# Patient Record
Sex: FEMALE | Race: WHITE | Employment: FULL TIME | URBAN - METROPOLITAN AREA
[De-identification: names, ages, dates, MRNs, and addresses within clinical notes are randomized per-mention and may not be internally consistent; named-entity substitution may affect disease eponyms.]

---

## 2016-10-03 LAB
ALT SERPL-CCNC: 25 U/L (ref 12–78)
AST SERPL-CCNC: 19 U/L (ref 15–37)
CHOLEST SERPL-MCNC: 195 MG/DL (ref 125–200)
CREAT SERPL-MCNC: 0.74 MG/DL (ref 0.55–1.3)
GLUCOSE SERPL-MCNC: 88 MG/DL (ref 65–100)
HDLC SERPL-MCNC: 63 MG/DL (ref 40–85)
LDLC SERPL CALC-MCNC: 112 MG/DL (ref 80–130)
POTASSIUM SERPL-SCNC: 4.2 MEQ/L (ref 3.5–5.4)
TRIGL SERPL-MCNC: 99 MG/DL (ref 45–150)

## 2017-06-07 LAB
ALT SERPL-CCNC: 28 U/L (ref 12–78)
AST SERPL-CCNC: 23 U/L (ref 15–37)
CREAT SERPL-MCNC: 0.8 MG/DL (ref 0.55–1.3)
GLUCOSE SERPL-MCNC: 96 MG/DL (ref 65–100)
POTASSIUM SERPL-SCNC: 4.5 MEQ/L (ref 3.5–5.4)

## 2017-06-13 ENCOUNTER — TRANSFERRED RECORDS (OUTPATIENT)
Dept: HEALTH INFORMATION MANAGEMENT | Facility: CLINIC | Age: 72
End: 2017-06-13

## 2017-06-24 ENCOUNTER — HEALTH MAINTENANCE LETTER (OUTPATIENT)
Age: 72
End: 2017-06-24

## 2017-07-12 ENCOUNTER — TRANSFERRED RECORDS (OUTPATIENT)
Dept: HEALTH INFORMATION MANAGEMENT | Facility: CLINIC | Age: 72
End: 2017-07-12

## 2017-09-09 ENCOUNTER — HEALTH MAINTENANCE LETTER (OUTPATIENT)
Age: 72
End: 2017-09-09

## 2017-10-04 ENCOUNTER — TRANSFERRED RECORDS (OUTPATIENT)
Dept: HEALTH INFORMATION MANAGEMENT | Facility: CLINIC | Age: 72
End: 2017-10-04

## 2017-10-11 LAB
CREAT SERPL-MCNC: 0.86 MG/DL (ref 0.55–1.3)
GFR SERPL CREATININE-BSD FRML MDRD: 70 ML/MIN/M2
GLUCOSE SERPL-MCNC: 105 MG/DL (ref 65–100)
POTASSIUM SERPL-SCNC: 5.1 MEQ/L (ref 3.5–5.4)

## 2017-10-18 ENCOUNTER — TRANSFERRED RECORDS (OUTPATIENT)
Dept: HEALTH INFORMATION MANAGEMENT | Facility: CLINIC | Age: 72
End: 2017-10-18

## 2018-06-30 ENCOUNTER — HEALTH MAINTENANCE LETTER (OUTPATIENT)
Age: 73
End: 2018-06-30

## 2019-02-19 ENCOUNTER — TRANSFERRED RECORDS (OUTPATIENT)
Dept: MULTI SPECIALTY CLINIC | Facility: CLINIC | Age: 74
End: 2019-02-19

## 2019-02-19 LAB
CREAT SERPL-MCNC: 0.75 MG/DL (ref 0.6–1)
GFR SERPL CREATININE-BSD FRML MDRD: >60 ML/MIN
GLUCOSE SERPL-MCNC: 103 MG/DL (ref 70–110)
POTASSIUM SERPL-SCNC: 4.1 MMOL/L (ref 3.5–5.1)
TSH SERPL-ACNC: 0.73 MIU/ML (ref 0.36–3.74)

## 2019-03-04 ENCOUNTER — TRANSFERRED RECORDS (OUTPATIENT)
Dept: HEALTH INFORMATION MANAGEMENT | Facility: CLINIC | Age: 74
End: 2019-03-04

## 2019-08-14 NOTE — PROGRESS NOTES
"SUBJECTIVE:   Cesia Cardona is a 73 year old female who presents for Preventive Visit.      Are you in the first 12 months of your Medicare coverage?  No    Healthy Habits:     In general, how would you rate your overall health?  Good    Frequency of exercise:  None    Do you usually eat at least 4 servings of fruit and vegetables a day, include whole grains    & fiber and avoid regularly eating high fat or \"junk\" foods?  No    Taking medications regularly:  Yes    Medication side effects:  None    Ability to successfully perform activities of daily living:  No assistance needed    Home Safety:  No safety concerns identified    Hearing Impairment:  No hearing concerns    In the past 6 months, have you been bothered by leaking of urine? Yes    In general, how would you rate your overall mental or emotional health?  Good      PHQ-2 Total Score: 2    Additional concerns today:  Yes    Do you feel safe in your environment? Yes    Do you have a Health Care Directive? Yes: Patient states has Advance Directive and will bring in a copy to clinic.       Fall risk  Fallen 2 or more times in the past year?: No  Any fall with injury in the past year?: No    Cognitive Screening   1) Repeat 3 items (Leader, Season, Table)    2) Clock draw: NORMAL  3) 3 item recall: Recalls 3 objects  Results: 3 items recalled: COGNITIVE IMPAIRMENT LESS LIKELY  Answers for HPI/ROS submitted by the patient on 8/21/2019   If you checked off any problems, how difficult have these problems made it for you to do your work, take care of things at home, or get along with other people?: Not difficult at all  PHQ9 TOTAL SCORE: 7  FARHAT 7 TOTAL SCORE: 4    Mini-CogTM Copyright KAMRAN Ortega. Licensed by the author for use in [facility]; reprinted with permission (esther@.[email or web address]). All rights reserved.          Reviewed and updated as needed this visit by clinical staff  Tobacco  Allergies  Meds  Problems  Med Hx  Surg Hx  Fam Hx  Soc Hx      "     Reviewed and updated as needed this visit by Provider  Tobacco  Allergies  Meds  Problems  Med Hx  Surg Hx  Fam Hx        Social History     Tobacco Use     Smoking status: Never Smoker     Smokeless tobacco: Never Used   Substance Use Topics     Alcohol use: Yes     Comment: socially     If you drink alcohol do you typically have >3 drinks per day or >7 drinks per week? Yes      Alcohol Use 8/21/2019   Prescreen: >3 drinks/day or >7 drinks/week? Yes   AUDIT SCORE  13     AUDIT - Alcohol Use Disorders Identification Test - Reproduced from the World Health Organization Audit 2001 (Second Edition) 8/21/2019   1.  How often do you have a drink containing alcohol? 2 to 3 times a week   2.  How many drinks containing alcohol do you have on a typical day when you are drinking? 3 or 4   3.  How often do you have five or more drinks on one occasion? Less than monthly   4.  How often during the last year have you found that you were not able to stop drinking once you had started? Daily or almost daily   5.  How often during the last year have you failed to do what was normally expected of you because of drinking? Less than monthly   6.  How often during the last year have you needed a first drink in the morning to get yourself going after a heavy drinking session? Never   7.  How often during the last year have you had a feeling of guilt or remorse after drinking? Less than monthly   8.  How often during the last year have you been unable to remember what happened the night before because of your drinking? Monthly   9.  Have you or someone else been injured because of your drinking? No   10. Has a relative, friend, doctor or other health care worker been concerned about your drinking or suggested you cut down? No   TOTAL SCORE 13           -------------------------------------    Current providers sharing in care for this patient include:   Patient Care Team:  Nery Mosley MD as PCP - General (Family  Practice)    The following health maintenance items are reviewed in Epic and correct as of today:  Health Maintenance   Topic Date Due     HEPATITIS C SCREENING  1945     ADVANCE CARE PLANNING  1945     DEPRESSION ACTION PLAN  1945     PHQ-9  1945     DTAP/TDAP/TD IMMUNIZATION (1 - Tdap) 10/19/1972     ZOSTER IMMUNIZATION (1 of 2) 10/19/1997     MAMMO SCREENING  04/01/2002     COLONOSCOPY  10/01/2007     PAP  04/12/2008     DEXA  05/05/2008     LIPID  04/12/2010     MEDICARE ANNUAL WELLNESS VISIT  10/19/2012     FALL RISK ASSESSMENT  10/19/2012     PNEUMOCOCCAL IMMUNIZATION 65+ LOW/MEDIUM RISK (2 of 2 - PCV13) 06/06/2019     INFLUENZA VACCINE (1) 09/01/2019     IPV IMMUNIZATION  Aged Out     MENINGITIS IMMUNIZATION  Aged Out     Labs reviewed in EPIC  Pneumonia Vaccine:unclear -- getting information from clinic as we have info on 23, but need 13 still, she is not convinced of this and thought she had both.  Mammogram Screening: Mammogram Screening: Patient over age 50, mutual decision to screen reflected in health maintenance.    Review of Systems   Constitutional: Negative for chills and fever.   HENT: Negative for congestion, ear pain, hearing loss and sore throat.    Eyes: Negative for pain and visual disturbance.   Respiratory: Positive for cough and shortness of breath.    Cardiovascular: Positive for peripheral edema. Negative for chest pain and palpitations.   Gastrointestinal: Negative for abdominal pain, constipation, diarrhea, heartburn, hematochezia and nausea.   Breasts:  Negative for tenderness, breast mass and discharge.   Genitourinary: Negative for dysuria, frequency, hematuria, pelvic pain, urgency, vaginal bleeding and vaginal discharge.   Musculoskeletal: Positive for arthralgias and joint swelling. Negative for myalgias.   Skin: Negative for rash.   Neurological: Negative for dizziness, weakness, headaches and paresthesias.   Psychiatric/Behavioral: Positive for mood  "changes. The patient is nervous/anxious.      Constitutional, HEENT, cardiovascular, pulmonary, GI, , musculoskeletal, neuro, skin, endocrine and psych systems are negative, except as in HPI or otherwise noted       OBJECTIVE:   /70   Pulse 72   Temp 97.7  F (36.5  C) (Temporal)   Resp 16   Ht 1.702 m (5' 7\")   Wt 101.2 kg (223 lb)   SpO2 96%   BMI 34.93 kg/m   Estimated body mass index is 34.93 kg/m  as calculated from the following:    Height as of this encounter: 1.702 m (5' 7\").    Weight as of this encounter: 101.2 kg (223 lb).  Physical Exam  GENERAL: healthy, alert and no distress  CV: regular rate and rhythm, normal S1 S2, no S3 or S4, no murmur, click or rub, no peripheral edema and peripheral pulses strong  MS: no gross musculoskeletal defects noted, no edema  SKIN: no suspicious lesions or rashes  NEURO: Normal strength and tone, mentation intact and speech normal  PSYCH: mentation appears normal, affect normal/bright        ASSESSMENT / PLAN:       ICD-10-CM    1. Encounter for routine adult health examination without abnormal findings Z00.00 MA SCREENING DIGITAL BILAT - Future  (s+30)     Fecal colorectal cancer screen (FIT)   2. 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 Screen for colon cancer Z12.11    4. Encounter for screening mammogram for breast cancer Z12.31    5. Menopause Z78.0    6. Encounter for Medicare annual wellness exam Z00.00    7. Age-related osteoporosis without current pathological fracture M81.0 alendronate (FOSAMAX) 70 MG tablet   8. Class 1 obesity due to excess calories without serious comorbidity with body mass index (BMI) of 34.0 to 34.9 in adult E66.09 Lipid panel reflex to direct LDL Fasting    Z68.34 Glucose     Discussed her osteoporosis - had been on fosamax, but unsure how long. Stopped as she only had osteopenia she believes. But had bone density in Feb showing osteoporosis and she did not hear from them a plan. " "Looks like multiple labs were done around that time likely for determining secondary reasons for osteoporosis and were normal. But I cannot find any notes discussing this. So will try to get the written records [facility] sent.  Also missing on records -- PCV 13, Tdap, Shingles vacccinations. Found a td, so suggested Tdap likely still needed.  Colon cancer screening -- plans FIT testing and cannot remember when last done. I have records of most of the labs from the last year, so suspect still needed now. Asked to pick one up in lab, but she did not today. Will need to do that and turn it in. She is planning to come back fasting for labs.  Lab - due for lipid and glucose - again would not complete until she checked on coverage. Planning call to her insurance to make sure not completed in the last year.  Mood: has been off 2 months from her celexa of 15+ years and actually is happier than ever. Though she is a little short with others. Discussed mood and admits she tends to over imbibe on alcohol at times and feels she has a bit of an addictive type personality whether over eating or over drinking. Discussed options to address this as well and that her old wellbutrin could have some benefit for cravings and she would like to try this as she is starting from a clean slate at this time. Certainly can return to [facility] as never had issues with this, but would like to try a change.    End of Life Planning:  Patient currently has an advanced directive: Yes.  Practitioner is supportive of decision.    COUNSELING:  Reviewed preventive health counseling, as reflected in patient instructions       Regular exercise       Healthy diet/nutrition    Estimated body mass index is 34.93 kg/m  as calculated from the following:    Height as of this encounter: 1.702 m (5' 7\").    Weight as of this encounter: 101.2 kg (223 lb).    Weight management plan: Discussed healthy diet and exercise guidelines     reports that she has never smoked. She " has never used smokeless tobacco.      Appropriate preventive services were discussed with this patient, including applicable screening as appropriate for cardiovascular disease, diabetes, osteopenia/osteoporosis, and glaucoma.  As appropriate for age/gender, discussed screening for colorectal cancer, prostate cancer, breast cancer, and cervical cancer. Checklist reviewing preventive services available has been given to the patient.    Reviewed patients plan of care and provided an AVS. The Basic Care Plan (routine screening as documented in Health Maintenance) for Cesia meets the Care Plan requirement. This Care Plan has been established and reviewed with the Patient.    Counseling Resources:  ATP IV Guidelines  Pooled Cohorts Equation Calculator  Breast Cancer Risk Calculator  FRAX Risk Assessment  ICSI Preventive Guidelines  Dietary Guidelines for Americans, 2010  USDA's MyPlate  ASA Prophylaxis  Lung CA Screening    Nery Mosley MD, MD  [facility]    Identified Health Risks:

## 2019-08-21 ENCOUNTER — OFFICE VISIT (OUTPATIENT)
Dept: FAMILY MEDICINE | Facility: OTHER | Age: 74
End: 2019-08-21
Payer: MEDICARE

## 2019-08-21 VITALS
TEMPERATURE: 97.7 F | RESPIRATION RATE: 16 BRPM | BODY MASS INDEX: 35 KG/M2 | DIASTOLIC BLOOD PRESSURE: 70 MMHG | WEIGHT: 223 LBS | HEART RATE: 72 BPM | SYSTOLIC BLOOD PRESSURE: 128 MMHG | HEIGHT: 67 IN | OXYGEN SATURATION: 96 %

## 2019-08-21 DIAGNOSIS — E66.811 CLASS 1 OBESITY DUE TO EXCESS CALORIES WITHOUT SERIOUS COMORBIDITY WITH BODY MASS INDEX (BMI) OF 34.0 TO 34.9 IN ADULT: ICD-10-CM

## 2019-08-21 DIAGNOSIS — Z00.00 ENCOUNTER FOR ROUTINE ADULT HEALTH EXAMINATION WITHOUT ABNORMAL FINDINGS: Primary | ICD-10-CM

## 2019-08-21 DIAGNOSIS — Z78.0 MENOPAUSE: ICD-10-CM

## 2019-08-21 DIAGNOSIS — Z00.00 ENCOUNTER FOR MEDICARE ANNUAL WELLNESS EXAM: ICD-10-CM

## 2019-08-21 DIAGNOSIS — E66.09 CLASS 1 OBESITY DUE TO EXCESS CALORIES WITHOUT SERIOUS COMORBIDITY WITH BODY MASS INDEX (BMI) OF 34.0 TO 34.9 IN ADULT: ICD-10-CM

## 2019-08-21 DIAGNOSIS — Z12.11 SCREEN FOR COLON CANCER: ICD-10-CM

## 2019-08-21 DIAGNOSIS — M81.0 AGE-RELATED OSTEOPOROSIS WITHOUT CURRENT PATHOLOGICAL FRACTURE: ICD-10-CM

## 2019-08-21 DIAGNOSIS — Z12.31 ENCOUNTER FOR SCREENING MAMMOGRAM FOR BREAST CANCER: ICD-10-CM

## 2019-08-21 DIAGNOSIS — F32.1 MODERATE MAJOR DEPRESSION (H): ICD-10-CM

## 2019-08-21 PROBLEM — E55.9 VITAMIN D DEFICIENCY: Status: ACTIVE | Noted: 2017-06-13

## 2019-08-21 PROCEDURE — 99213 OFFICE O/P EST LOW 20 MIN: CPT | Mod: 25 | Performed by: FAMILY MEDICINE

## 2019-08-21 PROCEDURE — G0439 PPPS, SUBSEQ VISIT: HCPCS | Performed by: FAMILY MEDICINE

## 2019-08-21 RX ORDER — ASPIRIN 81 MG/1
TABLET ORAL
COMMUNITY
Start: 2012-05-29 | End: 2022-07-27

## 2019-08-21 RX ORDER — CHLORAL HYDRATE 500 MG
CAPSULE ORAL
COMMUNITY
Start: 2016-10-03 | End: 2022-07-27

## 2019-08-21 RX ORDER — ALENDRONATE SODIUM 70 MG/1
70 TABLET ORAL
Qty: 12 TABLET | Refills: 3 | Status: SHIPPED | OUTPATIENT
Start: 2019-08-21 | End: 2020-07-01

## 2019-08-21 RX ORDER — BUPROPION HYDROCHLORIDE 150 MG/1
150 TABLET ORAL EVERY MORNING
Qty: 30 TABLET | Refills: 1 | Status: SHIPPED | OUTPATIENT
Start: 2019-08-21 | End: 2019-10-28

## 2019-08-21 ASSESSMENT — PATIENT HEALTH QUESTIONNAIRE - PHQ9
10. IF YOU CHECKED OFF ANY PROBLEMS, HOW DIFFICULT HAVE THESE PROBLEMS MADE IT FOR YOU TO DO YOUR WORK, TAKE CARE OF THINGS AT HOME, OR GET ALONG WITH OTHER PEOPLE: NOT DIFFICULT AT ALL
SUM OF ALL RESPONSES TO PHQ QUESTIONS 1-9: 7
SUM OF ALL RESPONSES TO PHQ QUESTIONS 1-9: 7

## 2019-08-21 ASSESSMENT — ANXIETY QUESTIONNAIRES
5. BEING SO RESTLESS THAT IT IS HARD TO SIT STILL: SEVERAL DAYS
GAD7 TOTAL SCORE: 4
GAD7 TOTAL SCORE: 4
7. FEELING AFRAID AS IF SOMETHING AWFUL MIGHT HAPPEN: NOT AT ALL
6. BECOMING EASILY ANNOYED OR IRRITABLE: SEVERAL DAYS
4. TROUBLE RELAXING: NOT AT ALL
3. WORRYING TOO MUCH ABOUT DIFFERENT THINGS: SEVERAL DAYS
GAD7 TOTAL SCORE: 4
1. FEELING NERVOUS, ANXIOUS, OR ON EDGE: SEVERAL DAYS
7. FEELING AFRAID AS IF SOMETHING AWFUL MIGHT HAPPEN: NOT AT ALL
2. NOT BEING ABLE TO STOP OR CONTROL WORRYING: NOT AT ALL

## 2019-08-21 ASSESSMENT — ENCOUNTER SYMPTOMS
COUGH: 1
HEMATURIA: 0
CHILLS: 0
SORE THROAT: 0
DIZZINESS: 0
FREQUENCY: 0
HEMATOCHEZIA: 0
PARESTHESIAS: 0
SHORTNESS OF BREATH: 1
JOINT SWELLING: 1
DIARRHEA: 0
ARTHRALGIAS: 1
NERVOUS/ANXIOUS: 1
HEADACHES: 0
MYALGIAS: 0
FEVER: 0
BREAST MASS: 0
CONSTIPATION: 0
HEARTBURN: 0
NAUSEA: 0
WEAKNESS: 0
DYSURIA: 0
PALPITATIONS: 0
EYE PAIN: 0
ABDOMINAL PAIN: 0

## 2019-08-21 ASSESSMENT — MIFFLIN-ST. JEOR: SCORE: 1559.15

## 2019-08-21 ASSESSMENT — PAIN SCALES - GENERAL: PAINLEVEL: NO PAIN (0)

## 2019-08-21 ASSESSMENT — ACTIVITIES OF DAILY LIVING (ADL): CURRENT_FUNCTION: NO ASSISTANCE NEEDED

## 2019-08-21 NOTE — PROGRESS NOTES
The patient reports that she drinks more than one alcoholic drink per day and sometimes engages in binge or excessive drinking. She was counseled and given information about possible harmful effects of excessive alcohol intake as well as where to get help for alcohol problems.

## 2019-08-21 NOTE — PATIENT INSTRUCTIONS
Please bring in copy of the advanced directive.        Patient Education   Personalized Prevention Plan  You are due for the preventive services outlined below.  Your care team is available to assist you in scheduling these services.  If you have already completed any of these items, please share that information with your care team to update in your medical record.  Health Maintenance Due   Topic Date Due     Hepatitis C Screening  1945     Discuss Advance Care Planning  1945     Depression Action Plan  1945     Depression Assessment  1945     Diptheria Tetanus Pertussis (DTAP/TDAP/TD) Vaccine (1 - Tdap) 10/19/1972     Zoster (Shingles) Vaccine (1 of 2) 10/19/1997     Mammogram  04/01/2002     Osteoporosis Screening  05/05/2007     Colonscopy  10/01/2007     PAP Smear  04/12/2008     Cholesterol Lab  04/12/2010     Annual Wellness Visit  10/19/2012     FALL RISK ASSESSMENT  10/19/2012     Pneumococcal Vaccine (2 of 2 - PCV13) 06/06/2019      Patient Education   Alcohol Use     Many people can enjoy a glass of wine or beer without any negative consequences to their health. According to the Centers for Disease Control and Prevention (CDC), having one or fewer drinks per day for women and two or fewer per day for men is considered moderate drinking.     When people drink more than moderately, it can become concerning. Excessive drinking is defined as consuming 15 drinks or more per week for men and 8 drinks or more per week for women. There are various health problems associated with excessive drinking, which include:    Damage to vital organs like the heart, brain, liver and pancreas    Harm to the digestive tract    Weaken the immune system    Higher risk for heart disease and cancer    There are many resources available to people who are addicted to alcohol. A counselor or other health care provider can give you support. So can a , , or rabbi who is trained in substance abuse  counseling. Friends and family may also help once you are connected with professionals.

## 2019-08-22 ASSESSMENT — PATIENT HEALTH QUESTIONNAIRE - PHQ9: SUM OF ALL RESPONSES TO PHQ QUESTIONS 1-9: 7

## 2019-08-22 ASSESSMENT — ANXIETY QUESTIONNAIRES: GAD7 TOTAL SCORE: 4

## 2019-10-02 ENCOUNTER — TELEPHONE (OUTPATIENT)
Dept: FAMILY MEDICINE | Facility: OTHER | Age: 74
End: 2019-10-02

## 2019-10-02 NOTE — LETTER
[address]   [employer] [address]  Phone: [phone]  October 3, 2019      Cesia Cardona  [address] [address] [address]      Dear Cesia,    We care about your health and have reviewed your health plan including your medical conditions, medications, and lab results.  Based on this review, it is recommended that you follow up regarding the following health topic(s):  -Cholesterol  -Depression  -Breast Cancer Screening    We recommend you take the following action(s):  -schedule a LAB ONLY APPOINTMENT to recheck your: Lipids (fasting cholesterol - nothing to eat except water and/or meds for 8-10 hours) and FIT and Glucose within the next 1-4 weeks.  If' you have had labs completed eslewhere, please let us know so we can update your records.    -schedule a MAMMOGRAM which is due. Please disregard this reminder if you have had this exam elsewhere within the last 1-2 years please let us know so we can update your records.  -Complete and return the attached PHQ-9 Form.  If your total score is greater than 9, please schedule a followup appointment.  If you answer Yes to question 9, call your clinic between the hours of 8 to 5.  You may also call the Suicide Hotline at 4-203-726-IKMH (0227) any time.     Please call us at the [facility] - [phone] (or use vozero) to address the above recommendations.     Thank you for trusting [facility] and we appreciate the opportunity to serve you.  We look forward to supporting your healthcare needs in the future.    Healthy Regards,    Your Health Care Team  [facility]

## 2019-10-02 NOTE — TELEPHONE ENCOUNTER
Panel Management Review    Summary:    Patient is due/failing the following:   LDL, Glucose, FIT, MAMMOGRAM and PHQ9    Action needed:   Patient needs to do PHQ9.  Patient needs fasting lab only appointment   Patient needs referral/order: Mammogram    Type of outreach:    Phone, left message for patient to call back.     Questions for provider review:    None                                                                                                                                    Lisha Simmons CMA ([facility])       Chart routed to Care Team .      Patient has the following on her problem list:     Depression / Dysthymia review    Measure:  Needs PHQ-9 score of 4 or less during index window.  Administer PHQ-9 and if score is 5 or more, send encounter to provider for next steps.    5 - 7 month window range:     PHQ-9 SCORE 8/21/2019   PHQ-9 Total Score MyChart 7 (Mild depression)   PHQ-9 Total Score 7       If PHQ-9 recheck is 5 or more, route to provider for next steps.    Patient is due for:  PHQ9      Composite cancer screening  Chart review shows that this patient is due/due soon for the following Mammogram and FIT

## 2019-10-03 ENCOUNTER — HEALTH MAINTENANCE LETTER (OUTPATIENT)
Age: 74
End: 2019-10-03

## 2019-10-03 NOTE — TELEPHONE ENCOUNTER
Left message for patient to call back. Please see message below. Sent letter as well.  Joyce Adkins, IRA

## 2019-10-08 ENCOUNTER — ANCILLARY PROCEDURE (OUTPATIENT)
Dept: MAMMOGRAPHY | Facility: OTHER | Age: 74
End: 2019-10-08
Payer: MEDICARE

## 2019-10-08 DIAGNOSIS — Z00.00 ENCOUNTER FOR ROUTINE ADULT HEALTH EXAMINATION WITHOUT ABNORMAL FINDINGS: ICD-10-CM

## 2019-10-08 PROCEDURE — 77067 SCR MAMMO BI INCL CAD: CPT | Mod: TC

## 2019-10-28 DIAGNOSIS — F32.1 MODERATE MAJOR DEPRESSION (H): ICD-10-CM

## 2019-10-29 RX ORDER — BUPROPION HYDROCHLORIDE 150 MG/1
TABLET ORAL
Qty: 30 TABLET | Refills: 0 | Status: SHIPPED | OUTPATIENT
Start: 2019-10-29 | End: 2019-12-09

## 2019-10-29 NOTE — TELEPHONE ENCOUNTER
Wellbutrin  Medication is being filled for 1 time refill only due to:  Patient needs to be seen because med check.    Bisi Frias, RN, BSN

## 2019-10-29 NOTE — TELEPHONE ENCOUNTER
Called and spoke with patient regarding message below.  Patient stated she is heading to [place] this week and will set up an appointment when she returns.  Patient stated she will see her physician when she gets there.  Lisha Simmons CMA ([facility])

## 2019-12-02 ENCOUNTER — TELEPHONE (OUTPATIENT)
Dept: FAMILY MEDICINE | Facility: OTHER | Age: 74
End: 2019-12-02

## 2019-12-02 DIAGNOSIS — F32.1 MODERATE MAJOR DEPRESSION (H): ICD-10-CM

## 2019-12-03 NOTE — TELEPHONE ENCOUNTER
Pending Prescriptions:                       Disp   Refills    buPROPion (WELLBUTRIN XL) 150 MG 24 hr tab*30 tab*0        Sig: Take 1 tablet (150 mg) by mouth every morning    Routing refill request to provider for review/approval because:  Arlette given x1 and patient did not follow up, please advise

## 2019-12-04 RX ORDER — BUPROPION HYDROCHLORIDE 150 MG/1
150 TABLET ORAL EVERY MORNING
Qty: 30 TABLET | Refills: 0 | OUTPATIENT
Start: 2019-12-04

## 2019-12-05 NOTE — TELEPHONE ENCOUNTER
LM for patient to return call. Please see below and transfer to the team with questions.     Bisi Frias, RN, BSN

## 2019-12-05 NOTE — TELEPHONE ENCOUNTER
Needs an appointment scheduled, then batsheva/return to me for refill until the visit. I believe she was going to f/u in [place] though  Nery Mosley MD

## 2019-12-09 RX ORDER — BUPROPION HYDROCHLORIDE 150 MG/1
150 TABLET ORAL EVERY MORNING
Qty: 30 TABLET | Refills: 0 | Status: SHIPPED | OUTPATIENT
Start: 2019-12-09 | End: 2020-01-06

## 2019-12-09 NOTE — TELEPHONE ENCOUNTER
Patient informed and scheduled. She is wondering if you would like her to do any labs or anything prior to her appt.

## 2020-01-03 DIAGNOSIS — E66.09 CLASS 1 OBESITY DUE TO EXCESS CALORIES WITHOUT SERIOUS COMORBIDITY WITH BODY MASS INDEX (BMI) OF 34.0 TO 34.9 IN ADULT: ICD-10-CM

## 2020-01-03 DIAGNOSIS — E66.811 CLASS 1 OBESITY DUE TO EXCESS CALORIES WITHOUT SERIOUS COMORBIDITY WITH BODY MASS INDEX (BMI) OF 34.0 TO 34.9 IN ADULT: ICD-10-CM

## 2020-01-03 LAB
CHOLEST SERPL-MCNC: 210 MG/DL
GLUCOSE SERPL-MCNC: 106 MG/DL (ref 70–99)
HDLC SERPL-MCNC: 67 MG/DL
LDLC SERPL CALC-MCNC: 113 MG/DL
NONHDLC SERPL-MCNC: 144 MG/DL
TRIGL SERPL-MCNC: 161 MG/DL

## 2020-01-03 PROCEDURE — 82947 ASSAY GLUCOSE BLOOD QUANT: CPT | Performed by: FAMILY MEDICINE

## 2020-01-03 PROCEDURE — 80061 LIPID PANEL: CPT | Performed by: FAMILY MEDICINE

## 2020-01-03 PROCEDURE — 36415 COLL VENOUS BLD VENIPUNCTURE: CPT | Performed by: FAMILY MEDICINE

## 2020-01-06 ENCOUNTER — TELEPHONE (OUTPATIENT)
Dept: FAMILY MEDICINE | Facility: OTHER | Age: 75
End: 2020-01-06

## 2020-01-06 ENCOUNTER — OFFICE VISIT (OUTPATIENT)
Dept: FAMILY MEDICINE | Facility: OTHER | Age: 75
End: 2020-01-06
Payer: MEDICARE

## 2020-01-06 VITALS
BODY MASS INDEX: 34.06 KG/M2 | WEIGHT: 217 LBS | OXYGEN SATURATION: 96 % | HEART RATE: 78 BPM | HEIGHT: 67 IN | TEMPERATURE: 98.2 F | DIASTOLIC BLOOD PRESSURE: 64 MMHG | SYSTOLIC BLOOD PRESSURE: 138 MMHG | RESPIRATION RATE: 16 BRPM

## 2020-01-06 DIAGNOSIS — Z23 NEED FOR PROPHYLACTIC VACCINATION AND INOCULATION AGAINST INFLUENZA: ICD-10-CM

## 2020-01-06 DIAGNOSIS — Z12.11 SCREEN FOR COLON CANCER: ICD-10-CM

## 2020-01-06 DIAGNOSIS — F32.1 MODERATE MAJOR DEPRESSION (H): Primary | ICD-10-CM

## 2020-01-06 LAB
ALBUMIN SERPL-MCNC: 4.3 G/DL (ref 3.4–5)
ALP SERPL-CCNC: 68 U/L (ref 40–150)
ALT SERPL W P-5'-P-CCNC: 26 U/L (ref 0–50)
ANION GAP SERPL CALCULATED.3IONS-SCNC: 6 MMOL/L (ref 3–14)
AST SERPL W P-5'-P-CCNC: 15 U/L (ref 0–45)
BILIRUB SERPL-MCNC: 0.5 MG/DL (ref 0.2–1.3)
BUN SERPL-MCNC: 18 MG/DL (ref 7–30)
CALCIUM SERPL-MCNC: 9.3 MG/DL (ref 8.5–10.1)
CHLORIDE SERPL-SCNC: 107 MMOL/L (ref 94–109)
CO2 SERPL-SCNC: 27 MMOL/L (ref 20–32)
CREAT SERPL-MCNC: 0.74 MG/DL (ref 0.52–1.04)
GFR SERPL CREATININE-BSD FRML MDRD: 81 ML/MIN/{1.73_M2}
GLUCOSE SERPL-MCNC: 100 MG/DL (ref 70–99)
POTASSIUM SERPL-SCNC: 4.6 MMOL/L (ref 3.4–5.3)
PROT SERPL-MCNC: 7.2 G/DL (ref 6.8–8.8)
SODIUM SERPL-SCNC: 140 MMOL/L (ref 133–144)

## 2020-01-06 PROCEDURE — 36415 COLL VENOUS BLD VENIPUNCTURE: CPT | Performed by: FAMILY MEDICINE

## 2020-01-06 PROCEDURE — 80053 COMPREHEN METABOLIC PANEL: CPT | Performed by: FAMILY MEDICINE

## 2020-01-06 PROCEDURE — 99214 OFFICE O/P EST MOD 30 MIN: CPT | Performed by: FAMILY MEDICINE

## 2020-01-06 RX ORDER — BUPROPION HYDROCHLORIDE 150 MG/1
150 TABLET ORAL EVERY MORNING
Qty: 30 TABLET | Refills: 0 | Status: CANCELLED | OUTPATIENT
Start: 2020-01-06

## 2020-01-06 RX ORDER — CITALOPRAM HYDROBROMIDE 20 MG/1
20 TABLET ORAL DAILY
Qty: 60 TABLET | Refills: 0 | Status: SHIPPED | OUTPATIENT
Start: 2020-01-06 | End: 2020-02-03

## 2020-01-06 ASSESSMENT — ANXIETY QUESTIONNAIRES
7. FEELING AFRAID AS IF SOMETHING AWFUL MIGHT HAPPEN: NOT AT ALL
GAD7 TOTAL SCORE: 5
3. WORRYING TOO MUCH ABOUT DIFFERENT THINGS: SEVERAL DAYS
7. FEELING AFRAID AS IF SOMETHING AWFUL MIGHT HAPPEN: NOT AT ALL
5. BEING SO RESTLESS THAT IT IS HARD TO SIT STILL: NOT AT ALL
6. BECOMING EASILY ANNOYED OR IRRITABLE: MORE THAN HALF THE DAYS
1. FEELING NERVOUS, ANXIOUS, OR ON EDGE: SEVERAL DAYS
4. TROUBLE RELAXING: NOT AT ALL
2. NOT BEING ABLE TO STOP OR CONTROL WORRYING: SEVERAL DAYS
GAD7 TOTAL SCORE: 5
GAD7 TOTAL SCORE: 5

## 2020-01-06 ASSESSMENT — MIFFLIN-ST. JEOR: SCORE: 1519

## 2020-01-06 ASSESSMENT — PATIENT HEALTH QUESTIONNAIRE - PHQ9
SUM OF ALL RESPONSES TO PHQ QUESTIONS 1-9: 15
10. IF YOU CHECKED OFF ANY PROBLEMS, HOW DIFFICULT HAVE THESE PROBLEMS MADE IT FOR YOU TO DO YOUR WORK, TAKE CARE OF THINGS AT HOME, OR GET ALONG WITH OTHER PEOPLE: SOMEWHAT DIFFICULT
SUM OF ALL RESPONSES TO PHQ QUESTIONS 1-9: 15

## 2020-01-06 NOTE — TELEPHONE ENCOUNTER
This patient came back for COMP blood test I believe it is waiting to be signed.    Thank you,  Marlen PLASCENCIA

## 2020-01-06 NOTE — PROGRESS NOTES
Subjective     Cesia Cardona is a 74 year old female who presents to clinic today for the following health issues:    Patient here to talk about switching medication, states the Wellbutrin is not working at all,she is very down. Declined influenza vaccine.     History of Present Illness        Mental Health Follow-up:  Patient presents to follow-up on Depression & Anxiety.Patient's depression since last visit has been:  Medium  The patient is not having other symptoms associated with depression.  Patient's anxiety since last visit has been:  Medium  The patient is not having other symptoms associated with anxiety.  Any significant life events: No  Patient is not feeling anxious or having panic attacks.  Patient has no concerns about alcohol or drug use.     Social History  Tobacco Use    Smoking status: Never Smoker    Smokeless tobacco: Never Used  Alcohol use: Yes    Comment: socially  Drug use: No      Today's PHQ-9         PHQ-9 Total Score:     (P) 15   PHQ-9 Q9 Thoughts of better off dead/self-harm past 2 weeks :   (P) Several days   Thoughts of suicide or self harm:  (P) No   Self-harm Plan:        Self-harm Action:          Safety concerns for self or others: (P) No         Hyperlipidemia:  She presents for follow up of hyperlipidemia.  She is not taking medication to lower cholesterol. She is not having myalgia or other side effects to statin medications.    She eats 2-3 servings of fruits and vegetables daily.She consumes 0 sweetened beverage(s) daily.  She is taking medications regularly.     Answers for HPI/ROS submitted by the patient on 1/6/2020   Chronic problems general questions HPI Form  If you checked off any problems, how difficult have these problems made it for you to do your work, take care of things at home, or get along with other people?: Somewhat difficult  PHQ9 TOTAL SCORE: 15  FARHAT 7 TOTAL SCORE: 5    Patient Active Problem List   Diagnosis     Depressive disorder, not elsewhere  "classified     Age-related osteoporosis without current pathological fracture     BMI 33.0-33.9,adult     Vitamin D deficiency     Past Surgical History:   Procedure Laterality Date     C LIGATE FALLOPIAN TUBE       HC REMOVAL OF TONSILS,<15 Y/O         Social History     Tobacco Use     Smoking status: Never Smoker     Smokeless tobacco: Never Used   Substance Use Topics     Alcohol use: Yes     Comment: socially     Family History   Problem Relation Age of Onset     Breast Cancer Mother      Diabetes Mother      Heart Disease Father      Hypertension Father      Hypertension Sister      C.A.D. Brother      Hypertension Brother      Lipids Brother      Musculoskeletal Disorder Brother         chronic back problems     Respiratory Brother         asthma     Psychotic Disorder Brother         depression     Gastrointestinal Disease Brother         inflammatory bowel and diverticulitis           Reviewed and updated as needed this visit by Provider  Tobacco  Allergies  Meds  Problems  Med Hx  Surg Hx  Fam Hx         Review of Systems   ROS COMP: Constitutional, HEENT, cardiovascular, pulmonary, GI, , musculoskeletal, neuro, skin, endocrine and psych systems are negative, except as otherwise noted.      Objective    /64 (BP Location: Right arm, Patient Position: Chair, Cuff Size: Adult Regular)   Pulse 78   Temp 98.2  F (36.8  C) (Temporal)   Resp 16   Ht 1.689 m (5' 6.5\")   Wt 98.4 kg (217 lb)   SpO2 96%   Breastfeeding No   BMI 34.50 kg/m    Body mass index is 34.5 kg/m .  Physical Exam   GENERAL: healthy, alert and no distress  RESP: lungs clear to auscultation - no rales, rhonchi or wheezes  CV: regular rate and rhythm, normal S1 S2, no S3 or S4, no murmur, click or rub, no peripheral edema and peripheral pulses strong  MS: no gross musculoskeletal defects noted, no edema  SKIN: no suspicious lesions or rashes  NEURO: Normal strength and tone, mentation intact and speech normal  PSYCH: " tearful and saddened            Assessment & Plan       ICD-10-CM    1. Moderate major depression (H) F32.1 citalopram (CELEXA) 20 MG tablet     Comprehensive metabolic panel (BMP + Alb, Alk Phos, ALT, AST, Total. Bili, TP)   2. Screen for colon cancer Z12.11    3. Need for prophylactic vaccination and inoculation against influenza Z23       Patient appears signicantly depressed today and is starting social isolation more and having trouble getting out of the house. Has reduced drinking and is turning less to food for emotional support - less cravings and overeating. Frustrated her mood is doing so poorly and insists on changing the wellbutrin. Will return to celexa as she was doing well with this for so long in the past. Though acknowledged that the wellbutrin may have helped on other fronts and may be best if she uses them together. She is not interested in this at this time.  F/u 1 month to revisit this.  Has been having yellow stools and read online this can be bile and is significantly anxiuos about this with her meds. Agreed to screen today. Though there are lots of things that may change stool coloring.      Return in about 4 weeks (around 2/3/2020) for mood.    Nery Mosley MD, MD  [facility]

## 2020-01-07 ASSESSMENT — PATIENT HEALTH QUESTIONNAIRE - PHQ9: SUM OF ALL RESPONSES TO PHQ QUESTIONS 1-9: 15

## 2020-01-07 ASSESSMENT — ANXIETY QUESTIONNAIRES: GAD7 TOTAL SCORE: 5

## 2020-01-07 NOTE — RESULT ENCOUNTER NOTE
Cesia, your results were all normal.  Please let me know if you have any questions.  Nery Mosley MD

## 2020-01-24 DIAGNOSIS — F32.1 MODERATE MAJOR DEPRESSION (H): ICD-10-CM

## 2020-01-24 RX ORDER — BUPROPION HYDROCHLORIDE 150 MG/1
TABLET ORAL
Qty: 30 TABLET | Refills: 0 | Status: SHIPPED | OUTPATIENT
Start: 2020-01-24 | End: 2020-02-03

## 2020-01-24 NOTE — TELEPHONE ENCOUNTER
Had significant symptoms at ov. Please schedule f/u.  Due for appt. Arlette given, please schedule. (Phone, ov, or evisit as appropriate).  Nery Mosley MD      Spoke with Cesia and she is going to call [facility] as she is not wanting refills on her Wellbutrin. - as she had side effects she did not like..

## 2020-01-24 NOTE — TELEPHONE ENCOUNTER
Pending Prescriptions:                       Disp   Refills    buPROPion (WELLBUTRIN XL) 150 MG 24 hr tab*30 tab*1        Sig: TAKE 1 TABLET BY MOUTH EVERY MORNING    PHQ-9 SCORE 8/21/2019 1/6/2020   PHQ-9 Total Score MyChart 7 (Mild depression) 15 (Moderately severe depression)   PHQ-9 Total Score 7 15     Routing refill request to provider for review/approval because:  Drug not active on patient's medication list    Nancy Brock, MSN, RN

## 2020-01-28 NOTE — PROGRESS NOTES
Subjective     Cesia Cardona is a 74 year old female who presents to clinic today for the following health issues:    History of Present Illness        Mental Health Follow-up:  Patient presents to follow-up on Depression & Anxiety.Patient's depression since last visit has been:  Good  The patient is not having other symptoms associated with depression.  Patient's anxiety since last visit has been:  Good  The patient is not having other symptoms associated with anxiety.  Any significant life events: No  Patient is not feeling anxious or having panic attacks.  Patient has no concerns about alcohol or drug use.     Social History  Tobacco Use    Smoking status: Never Smoker    Smokeless tobacco: Never Used  Alcohol use: Yes    Comment: socially  Drug use: No      Today's PHQ-9         PHQ-9 Total Score:     (P) 2   PHQ-9 Q9 Thoughts of better off dead/self-harm past 2 weeks :   (P) Not at all   Thoughts of suicide or self harm:      Self-harm Plan:        Self-harm Action:          Safety concerns for self or others:           Hypertension: She presents for follow up of hypertension.  She does not check blood pressure  regularly outside of the clinic. Outpatient blood pressures have not been over 140/90. She follows a low salt diet.     She eats 2-3 servings of fruits and vegetables daily.She consumes 0 sweetened beverage(s) daily.She exercises with enough effort to increase her heart rate 9 or less minutes per day.  She exercises with enough effort to increase her heart rate 3 or less days per week.   She is taking medications regularly.     Answers for HPI/ROS submitted by the patient on 2/3/2020   Chronic problems general questions HPI Form  If you checked off any problems, how difficult have these problems made it for you to do your work, take care of things at home, or get along with other people?: Not difficult at all  PHQ9 TOTAL SCORE: 2  FARHAT 7 TOTAL SCORE:  "2        -------------------------------------    Patient Active Problem List   Diagnosis     Depressive disorder, not elsewhere classified     Age-related osteoporosis without current pathological fracture     BMI 33.0-33.9,adult     Vitamin D deficiency     Past Surgical History:   Procedure Laterality Date     C LIGATE FALLOPIAN TUBE       HC REMOVAL OF TONSILS,<13 Y/O         Social History     Tobacco Use     Smoking status: Never Smoker     Smokeless tobacco: Never Used   Substance Use Topics     Alcohol use: Yes     Comment: socially     Family History   Problem Relation Age of Onset     Breast Cancer Mother      Diabetes Mother      Heart Disease Father      Hypertension Father      Hypertension Sister      C.A.D. Brother      Hypertension Brother      Lipids Brother      Musculoskeletal Disorder Brother         chronic back problems     Respiratory Brother         asthma     Psychotic Disorder Brother         depression     Gastrointestinal Disease Brother         inflammatory bowel and diverticulitis           Reviewed and updated as needed this visit by Provider  Tobacco  Allergies  Meds  Problems  Med Hx  Surg Hx  Fam Hx         Review of Systems   ROS COMP: Constitutional, HEENT, cardiovascular, pulmonary, GI, , musculoskeletal, neuro, skin, endocrine and psych systems are negative, except as otherwise noted.      Objective    /62 (BP Location: Right arm, Patient Position: Chair)   Pulse 72   Temp 98.8  F (37.1  C) (Temporal)   Resp 16   Ht 1.689 m (5' 6.5\")   Wt 97.5 kg (215 lb)   SpO2 97%   Breastfeeding No   BMI 34.18 kg/m    Body mass index is 34.18 kg/m .  Physical Exam   GENERAL: healthy, alert and no distress  NECK: no adenopathy, no asymmetry, masses, or scars and thyroid normal to palpation  RESP: lungs clear to auscultation - no rales, rhonchi or wheezes  CV: regular rate and rhythm, normal S1 S2, no S3 or S4, no murmur, click or rub, no peripheral edema and peripheral " pulses strong  ABDOMEN: soft, nontender, no hepatosplenomegaly, no masses and bowel sounds normal  MS: no gross musculoskeletal defects noted, no edema  SKIN: no suspicious lesions or rashes  NEURO: Normal strength and tone, mentation intact and speech normal  PSYCH: mentation appears normal, affect normal/bright            Assessment & Plan       ICD-10-CM    1. 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 Discussed that she may not, but she looked pretty withdrawn at her last appt, so I would be surprised if she was able to be controlled on just 10 mg. Prefer she stay on full 20 mg. She agrees. F/u 6 months.    Return in about 6 months (around 8/3/2020) for mood.    Nery Mosley MD, MD  [facility]

## 2020-02-03 ENCOUNTER — OFFICE VISIT (OUTPATIENT)
Dept: FAMILY MEDICINE | Facility: OTHER | Age: 75
End: 2020-02-03
Payer: MEDICARE

## 2020-02-03 VITALS
WEIGHT: 215 LBS | SYSTOLIC BLOOD PRESSURE: 130 MMHG | TEMPERATURE: 98.8 F | HEART RATE: 72 BPM | HEIGHT: 67 IN | BODY MASS INDEX: 33.74 KG/M2 | OXYGEN SATURATION: 97 % | RESPIRATION RATE: 16 BRPM | DIASTOLIC BLOOD PRESSURE: 62 MMHG

## 2020-02-03 DIAGNOSIS — F32.1 MODERATE MAJOR DEPRESSION (H): ICD-10-CM

## 2020-02-03 PROCEDURE — 99213 OFFICE O/P EST LOW 20 MIN: CPT | Performed by: FAMILY MEDICINE

## 2020-02-03 RX ORDER — CITALOPRAM HYDROBROMIDE 20 MG/1
20 TABLET ORAL DAILY
Qty: 90 TABLET | Refills: 1 | Status: SHIPPED | OUTPATIENT
Start: 2020-02-03 | End: 2020-09-24

## 2020-02-03 RX ORDER — BUPROPION HYDROCHLORIDE 150 MG/1
150 TABLET ORAL EVERY MORNING
Qty: 30 TABLET | Refills: 0 | Status: CANCELLED | OUTPATIENT
Start: 2020-02-03

## 2020-02-03 ASSESSMENT — PATIENT HEALTH QUESTIONNAIRE - PHQ9
SUM OF ALL RESPONSES TO PHQ QUESTIONS 1-9: 2
10. IF YOU CHECKED OFF ANY PROBLEMS, HOW DIFFICULT HAVE THESE PROBLEMS MADE IT FOR YOU TO DO YOUR WORK, TAKE CARE OF THINGS AT HOME, OR GET ALONG WITH OTHER PEOPLE: NOT DIFFICULT AT ALL
SUM OF ALL RESPONSES TO PHQ QUESTIONS 1-9: 2

## 2020-02-03 ASSESSMENT — ANXIETY QUESTIONNAIRES
GAD7 TOTAL SCORE: 2
5. BEING SO RESTLESS THAT IT IS HARD TO SIT STILL: NOT AT ALL
3. WORRYING TOO MUCH ABOUT DIFFERENT THINGS: NOT AT ALL
6. BECOMING EASILY ANNOYED OR IRRITABLE: SEVERAL DAYS
GAD7 TOTAL SCORE: 2
GAD7 TOTAL SCORE: 2
1. FEELING NERVOUS, ANXIOUS, OR ON EDGE: NOT AT ALL
7. FEELING AFRAID AS IF SOMETHING AWFUL MIGHT HAPPEN: NOT AT ALL
7. FEELING AFRAID AS IF SOMETHING AWFUL MIGHT HAPPEN: NOT AT ALL
4. TROUBLE RELAXING: NOT AT ALL
2. NOT BEING ABLE TO STOP OR CONTROL WORRYING: SEVERAL DAYS

## 2020-02-03 ASSESSMENT — MIFFLIN-ST. JEOR: SCORE: 1509.92

## 2020-02-04 ASSESSMENT — PATIENT HEALTH QUESTIONNAIRE - PHQ9: SUM OF ALL RESPONSES TO PHQ QUESTIONS 1-9: 2

## 2020-02-04 ASSESSMENT — ANXIETY QUESTIONNAIRES: GAD7 TOTAL SCORE: 2

## 2020-02-06 DIAGNOSIS — Z00.00 ENCOUNTER FOR ROUTINE ADULT HEALTH EXAMINATION WITHOUT ABNORMAL FINDINGS: ICD-10-CM

## 2020-02-06 PROCEDURE — 82274 ASSAY TEST FOR BLOOD FECAL: CPT | Performed by: FAMILY MEDICINE

## 2020-02-10 LAB — HEMOCCULT STL QL IA: NEGATIVE

## 2020-03-09 DIAGNOSIS — F32.1 MODERATE MAJOR DEPRESSION (H): ICD-10-CM

## 2020-03-10 RX ORDER — CITALOPRAM HYDROBROMIDE 20 MG/1
TABLET ORAL
Qty: 60 TABLET | Refills: 0 | OUTPATIENT
Start: 2020-03-10

## 2020-03-10 NOTE — TELEPHONE ENCOUNTER
Pending Prescriptions:                       Disp   Refills    citalopram (CELEXA) 20 MG tablet [Pharmac*60 tab*0            Sig: TAKE 1/2 TABLET (10MG) BY MOUTH DAILY FOR 7-10           DAYS THEN TAKE 1 TAB DAILY    Sent 2/3/20 with 6 month supply. Refill not appropriate at this time.    Nancy Brock, MSN, RN

## 2020-03-20 DIAGNOSIS — F32.1 MODERATE MAJOR DEPRESSION (H): ICD-10-CM

## 2020-03-20 RX ORDER — CITALOPRAM HYDROBROMIDE 20 MG/1
TABLET ORAL
Qty: 60 TABLET | Refills: 0 | OUTPATIENT
Start: 2020-03-20

## 2020-07-01 DIAGNOSIS — M81.0 AGE-RELATED OSTEOPOROSIS WITHOUT CURRENT PATHOLOGICAL FRACTURE: ICD-10-CM

## 2020-07-01 RX ORDER — ALENDRONATE SODIUM 70 MG/1
TABLET ORAL
Qty: 12 TABLET | Refills: 1 | Status: SHIPPED | OUTPATIENT
Start: 2020-07-01 | End: 2020-09-24

## 2020-07-01 NOTE — TELEPHONE ENCOUNTER
Prescription approved per [facility].    Bisi Frias, RN, BSN

## 2020-09-09 DIAGNOSIS — F32.1 MODERATE MAJOR DEPRESSION (H): ICD-10-CM

## 2020-09-11 NOTE — TELEPHONE ENCOUNTER
Pending Prescriptions:                       Disp   Refills    citalopram (CELEXA) 20 MG tablet [Pharmacy*90 tab*1        Sig: TAKE 1 TABLET BY MOUTH EVERY DAY      Support Staff:   Please call patient to schedule a visit. (F2F, Video, Phone or E-visit) mood follow up  Then ask if patient will need a refill of the above medication before the visit.   Please re-flag for RN and then route to refill pool to give a 30 or 90 day ramon to get them to their visit or to remove the medication and to close the encounter.    After 3 attempts to reach patient, please route to provider to review and advise.    Thank you!    Christos Moncada, BSN, RN, PHN

## 2020-09-17 RX ORDER — CITALOPRAM HYDROBROMIDE 20 MG/1
TABLET ORAL
Qty: 90 TABLET | Refills: 1 | OUTPATIENT
Start: 2020-09-17

## 2020-09-21 NOTE — PROGRESS NOTES
"Cesia Cardona is a 74 year old female who is being evaluated via a billable telephone visit.      The patient has been notified of following:     \"This telephone visit will be conducted via a call between you and your physician/provider. We have found that certain health care needs can be provided without the need for a physical exam.  This service lets us provide the care you need with a short phone conversation.  If a prescription is necessary we can send it directly to your pharmacy.  If lab work is needed we can place an order for that and you can then stop by our lab to have the test done at a later time.    Telephone visits are billed at different rates depending on your insurance coverage. During this emergency period, for some insurers they may be billed the same as an in-person visit.  Please reach out to your insurance provider with any questions.    If during the course of the call the physician/provider feels a telephone visit is not appropriate, you will not be charged for this service.\"    Patient has given verbal consent for Telephone visit?  Yes    What phone number would you like to be contacted at? [phone]    How would you like to obtain your AVS? Sashahart    Subjective     Cesia Cardona is a 74 year old female who presents via phone visit today for the following health issues:    HPI  DECLINED MCW     Depression Followup    How are you doing with your depression since your last visit? No change    Are you having other symptoms that might be associated with depression? No    Have you had a significant life event?  No     Are you feeling anxious or having panic attacks?   No    Do you have any concerns with your use of alcohol or other drugs? No    Social History     Tobacco Use     Smoking status: Never Smoker     Smokeless tobacco: Never Used   Substance Use Topics     Alcohol use: Yes     Comment: socially     Drug use: No     PHQ 1/6/2020 2/3/2020 9/24/2020   PHQ-9 Total Score 15 2 4 "   Q9: Thoughts of better off dead/self-harm past 2 weeks Several days Not at all Not at all   F/U: Thoughts of suicide or self-harm No - -   F/U: Safety concerns No - -     FARHAT-7 SCORE 1/6/2020 2/3/2020 9/24/2020   Total Score 5 (mild anxiety) 2 (minimal anxiety) -   Total Score 5 2 3     Tried to take 1/2 as she was doing well.       Suicide Assessment Five-step Evaluation and Treatment (SAFE-T)      How many servings of fruits and vegetables do you eat daily?  0-1    On average, how many sweetened beverages do you drink each day (Examples: soda, juice, sweet tea, etc.  Do NOT count diet or artificially sweetened beverages)?   0    How many days per week do you exercise enough to make your heart beat faster? 3 or less    How many minutes a day do you exercise enough to make your heart beat faster? 10 - 19    How many days per week do you miss taking your medication? 0       Bone density - takes her fosamax,       Review of Systems   Constitutional, HEENT, cardiovascular, pulmonary, GI, , musculoskeletal, neuro, skin, endocrine and psych systems are negative, except as otherwise noted.       Objective          Vitals:  No vitals were obtained today due to virtual visit.    healthy, alert and no distress  PSYCH: Alert and oriented times 3; coherent speech, normal   rate and volume, able to articulate logical thoughts, able   to abstract reason, no tangential thoughts, no hallucinations   or delusions  Her affect is normal  RESP: No cough, no audible wheezing, able to talk in full sentences  Remainder of exam unable to be completed due to telephone visits            Assessment/Plan:    Assessment & Plan       ICD-10-CM    1. Moderate major depression (H)  F32.1 citalopram (CELEXA) 20 MG tablet   2. Age-related osteoporosis without current pathological fracture  M81.0 alendronate (FOSAMAX) 70 MG tablet   3. Encounter for screening mammogram for breast cancer  Z12.31    4. Screen for colon cancer  Z12.11 Fecal  "colorectal cancer screen FIT - Future (S+30)      Would like to stay at home at this time, declines mammo. Would like FIT test sent to her either now or reminder to send closer to when due in Feb. Will delay recheck on dexa until next year and plan mood follow-up virtually.  Is working on adv dir, but would like a new one sent to her. Had been confused on a few questions and we talked these out today as well as directed to [employer] website for more general information or can reach out here if needed.     BMI:   Estimated body mass index is 34.18 kg/m  as calculated from the following:    Height as of 2/3/20: 1.689 m (5' 6.5\").    Weight as of 2/3/20: 97.5 kg (215 lb).   Weight management plan: Discussed healthy diet and exercise guidelines          No follow-ups on file.    Nery Mosley MD, MD  [facility]    Phone call duration:  15 minutes                "

## 2020-09-24 ENCOUNTER — VIRTUAL VISIT (OUTPATIENT)
Dept: FAMILY MEDICINE | Facility: OTHER | Age: 75
End: 2020-09-24
Payer: MEDICARE

## 2020-09-24 DIAGNOSIS — Z12.31 ENCOUNTER FOR SCREENING MAMMOGRAM FOR BREAST CANCER: ICD-10-CM

## 2020-09-24 DIAGNOSIS — M81.0 AGE-RELATED OSTEOPOROSIS WITHOUT CURRENT PATHOLOGICAL FRACTURE: ICD-10-CM

## 2020-09-24 DIAGNOSIS — F32.1 MODERATE MAJOR DEPRESSION (H): Primary | ICD-10-CM

## 2020-09-24 DIAGNOSIS — Z12.11 SCREEN FOR COLON CANCER: ICD-10-CM

## 2020-09-24 PROCEDURE — 99442 ZZC PHYSICIAN TELEPHONE EVALUATION 11-20 MIN: CPT | Mod: 95 | Performed by: FAMILY MEDICINE

## 2020-09-24 RX ORDER — ALENDRONATE SODIUM 70 MG/1
TABLET ORAL
Qty: 12 TABLET | Refills: 3 | Status: SHIPPED | OUTPATIENT
Start: 2020-09-24 | End: 2021-05-05

## 2020-09-24 RX ORDER — CITALOPRAM HYDROBROMIDE 20 MG/1
20 TABLET ORAL DAILY
Qty: 90 TABLET | Refills: 1 | Status: SHIPPED | OUTPATIENT
Start: 2020-09-24 | End: 2021-05-05

## 2020-09-24 ASSESSMENT — PATIENT HEALTH QUESTIONNAIRE - PHQ9
5. POOR APPETITE OR OVEREATING: NOT AT ALL
SUM OF ALL RESPONSES TO PHQ QUESTIONS 1-9: 4

## 2020-09-24 ASSESSMENT — ANXIETY QUESTIONNAIRES
5. BEING SO RESTLESS THAT IT IS HARD TO SIT STILL: NOT AT ALL
7. FEELING AFRAID AS IF SOMETHING AWFUL MIGHT HAPPEN: NOT AT ALL
GAD7 TOTAL SCORE: 3
6. BECOMING EASILY ANNOYED OR IRRITABLE: MORE THAN HALF THE DAYS
3. WORRYING TOO MUCH ABOUT DIFFERENT THINGS: SEVERAL DAYS
2. NOT BEING ABLE TO STOP OR CONTROL WORRYING: NOT AT ALL
IF YOU CHECKED OFF ANY PROBLEMS ON THIS QUESTIONNAIRE, HOW DIFFICULT HAVE THESE PROBLEMS MADE IT FOR YOU TO DO YOUR WORK, TAKE CARE OF THINGS AT HOME, OR GET ALONG WITH OTHER PEOPLE: SOMEWHAT DIFFICULT
1. FEELING NERVOUS, ANXIOUS, OR ON EDGE: NOT AT ALL

## 2020-09-25 ASSESSMENT — ANXIETY QUESTIONNAIRES: GAD7 TOTAL SCORE: 3

## 2020-12-15 ENCOUNTER — NURSE TRIAGE (OUTPATIENT)
Dept: FAMILY MEDICINE | Facility: OTHER | Age: 75
End: 2020-12-15

## 2020-12-15 ENCOUNTER — MYC MEDICAL ADVICE (OUTPATIENT)
Dept: FAMILY MEDICINE | Facility: OTHER | Age: 75
End: 2020-12-15

## 2020-12-15 NOTE — TELEPHONE ENCOUNTER
Reason for Call:  Other Question    Detailed comments: Patient is calling in today in regards to having a aleksandra horse a week ago. Patient stated that her whole calf is black and blue. Patient is concerned of blood clots at this time due to all the busing.Patient is going to try and send a message through my chart with the picture. What should she do? Thank you    Phone Number Patient can be reached at: Home number on file [phone] (home)    Best Time: anytime    Can we leave a detailed message on this number? YES    Call taken on 12/15/2020 at 2:04 PM by Samreen Vargas

## 2020-12-15 NOTE — TELEPHONE ENCOUNTER
Additional Information    Thigh, calf, or ankle swelling in only one leg    Negative: Thigh or calf pain in only one leg and present > 1 hour    Negative: Fever and swollen joint    Protocols used: LEG PAIN-A-OH

## 2020-12-15 NOTE — TELEPHONE ENCOUNTER
"Spoke with patient    1. ONSET: \"When did the pain start?\"       Week ago  2. LOCATION: \"Where is the pain located?\"       Back of right lower leg  3. PAIN: \"How bad is the pain?\"    (Scale 1-10; or mild, moderate, severe)    -  MILD (1-3): doesn't interfere with normal activities     -  MODERATE (4-7): interferes with normal activities (e.g., work or school) or awakens from sleep, limping     -  SEVERE (8-10): excruciating pain, unable to do any normal activities, unable to walk      4/10  4. WORK OR EXERCISE: \"Has there been any recent work or exercise that involved this part of the body?\"       Has been on it and using it.  Will sit and ice  5. CAUSE: \"What do you think is causing the leg pain?\"      leg cramp and night and heard a pop  6. OTHER SYMPTOMS: \"Do you have any other symptoms?\" (e.g., chest pain, back pain, breathing difficulty, swelling, rash, fever, numbness, weakness)      None, feels good otherwise    7. PREGNANCY: \"Is there any chance you are pregnant?\" \"When was your last menstrual period?\"      Not asked    Will need second level triage can this be seen in clinic? Or would you suggest MAYELA/ER tonight still?  This has been going on for a week and not worsening, bruising is dropping with gravity.     She has been icing and using Epsom salts as she thought it was a aleksandra horse that popped    Please advise.    Christos Moncada, LUCN, RN, PHN                Additional Information    Negative: Looks like a broken bone or dislocated joint (e.g., crooked or deformed)    Negative: Sounds like a life-threatening emergency to the triager    Negative: Followed a hip injury    Negative: Followed a knee injury    Negative: Followed an ankle or foot injury    Negative: Back pain radiating (shooting) into leg(s)    Negative: Foot pain is the main symptom    Negative: Ankle pain is the main symptom    Negative: Knee pain is the main symptom    Negative: Leg swelling is the main symptom    Negative: Chest pain    " Negative: Difficulty breathing    Negative: Entire foot is cool or blue in comparison to other side    Negative: Unable to walk    Protocols used: LEG PAIN-A-OH

## 2020-12-16 ENCOUNTER — ANCILLARY PROCEDURE (OUTPATIENT)
Dept: ULTRASOUND IMAGING | Facility: OTHER | Age: 75
End: 2020-12-16
Attending: PHYSICIAN ASSISTANT
Payer: MEDICARE

## 2020-12-16 ENCOUNTER — OFFICE VISIT (OUTPATIENT)
Dept: FAMILY MEDICINE | Facility: OTHER | Age: 75
End: 2020-12-16
Payer: MEDICARE

## 2020-12-16 VITALS
OXYGEN SATURATION: 96 % | HEART RATE: 76 BPM | BODY MASS INDEX: 33.02 KG/M2 | WEIGHT: 210.4 LBS | SYSTOLIC BLOOD PRESSURE: 164 MMHG | DIASTOLIC BLOOD PRESSURE: 90 MMHG | RESPIRATION RATE: 18 BRPM | TEMPERATURE: 98.2 F | HEIGHT: 67 IN

## 2020-12-16 DIAGNOSIS — M79.89 LEG SWELLING: ICD-10-CM

## 2020-12-16 DIAGNOSIS — M79.89 LEG SWELLING: Primary | ICD-10-CM

## 2020-12-16 PROCEDURE — 99213 OFFICE O/P EST LOW 20 MIN: CPT | Performed by: PHYSICIAN ASSISTANT

## 2020-12-16 ASSESSMENT — PAIN SCALES - GENERAL: PAINLEVEL: MODERATE PAIN (4)

## 2020-12-16 ASSESSMENT — MIFFLIN-ST. JEOR: SCORE: 1484.06

## 2020-12-16 NOTE — PATIENT INSTRUCTIONS
Likely a popliteal cyst that had ruptured.   No need for further evaluation, no signs of clot on the ultrasound    Elevation  Ace bandage starting at the foot and wrapping upwards, enough pressure that it feels secure but you can still feel your toes.   Ice/heat whichever feels best  It will take time for the body to reabsorb the fluid.

## 2020-12-16 NOTE — PROGRESS NOTES
"Subjective     Cesia Cardona is a 75 year old female who presents to clinic today for the following health issues:    HPI          1. ONSET: \"When did the pain start?\"       Week ago  2. LOCATION: \"Where is the pain located?\"       Back of right lower leg  3. PAIN: \"How bad is the pain?\"    (Scale 1-10; or mild, moderate, severe)    -  MILD (1-3): doesn't interfere with normal activities     -  MODERATE (4-7): interferes with normal activities (e.g., work or school) or awakens from sleep, limping     -  SEVERE (8-10): excruciating pain, unable to do any normal activities, unable to walk      4/10  4. WORK OR EXERCISE: \"Has there been any recent work or exercise that involved this part of the body?\"       Has been on it and using it.  Will sit and ice  5. CAUSE: \"What do you think is causing the leg pain?\"      leg cramp and night and heard a pop  6. OTHER SYMPTOMS: \"Do you have any other symptoms?\" (e.g., chest pain, back pain, breathing difficulty, swelling, rash, fever, numbness, weakness)      None, feels good otherwise      Symptoms started a week ago.   She felt a \"aleksandra horse\" in her leg and then a pop.   She subsequently noticed bruising later on.   The bruising and swelling has been pretty stable, slightly moving down the calf more and into the ankle.   She has some pain with touching and massaging the area but is able to still walk without difficulty.   Denies injuries.   Denies fevers, chills, chest pain, shortness of breath, cough, hemoptysis, numbness/tingling in the leg .  No history of similar symptoms.  The knee feels ok.     Review of Systems   Constitutional, cardiovascular, pulmonary, musculoskeletal, neuro, skin systems are negative, except as otherwise noted.      Objective    BP (!) 164/90   Pulse 76   Temp 98.2  F (36.8  C) (Temporal)   Resp 18   Ht 1.689 m (5' 6.5\")   Wt 95.4 kg (210 lb 6.4 oz)   SpO2 96%   BMI 33.45 kg/m    Body mass index is 33.45 kg/m .  Physical Exam   GENERAL: " healthy, alert and no distress  MS: normal muscle tone and RLE - full active ROM of the knee and ankle without pain,  Mildly tender to palpation over the calf without palpable masses or cords.   SKIN: RLE - normal distal pulses, bruising and edema 2+ pitting in the calf and ankle.  See picture in MyChart  NEURO: Normal strength and tone, mentation intact and speech normal  PSYCH: mentation appears normal, affect normal/bright    US LOWER EXTREMITY VENOUS DUPLEX RIGHT 12/16/2020 3:49 PM     CLINICAL HISTORY: Swelling, bruising, pain lower leg, question  popliteal cyst. Leg swelling.     TECHNIQUE: Venous Duplex ultrasound of the right lower extremity with  and without compression, augmentation and duplex. Color flow and  spectral Doppler with waveform analysis performed.     COMPARISON: None.     FINDINGS: Exam includes the common femoral, femoral, popliteal, and  contralateral common femoral veins as well as segmentally visualized  deep calf veins and greater saphenous vein.      RIGHT: No deep vein thrombosis. No superficial thrombophlebitis. There  is a 6.0 x 1.3 x 4.2 cm simple fluid collection right posterior  proximal calf. This does not have the typical appearance of a Baker's  cyst.                                                                       IMPRESSION:  1.  No evidence of DVT.  2.  6.0 x 1.3 x 4.2 cm fluid collection right posterior proximal  medial calf. This is of uncertain etiology, but does not have the  typical appearance of a Baker's cyst. If there has been an injury,  this could be a resolving hematoma. If symptoms persist, MRI can be  performed of the knee.        Assessment & Plan     Cesia was seen today for bleeding/bruising.    Diagnoses and all orders for this visit:    Leg swelling  -     US Lower Extremity Venous Duplex Right; Future            Was able to obtain ultrasound in clinic while patient was present, low suspicion for deep venous thrombosis but patient's family very  concerned and it is unilateral without direct trauma.  There is fluid on ultrasound, question ruptured baker's cyst despite no signs of cyst  But symptoms started 1 week ago.  Discussed compression, elevation, ice/heat, tylenol as needed.  If not continuing to improve recommend follow-up.     Return in about 3 months (around 3/16/2021) for Lab Work, Physical Exam, Medication Re-check.     Options for treatment and follow-up care were reviewed with the patient and/or guardian. Patient and/or guardian engaged in the decision making process and verbalized understanding of the options discussed and agreed with the final plan.     Tong Coyne PA-C  [facility]

## 2021-01-06 DIAGNOSIS — Z12.11 SCREEN FOR COLON CANCER: ICD-10-CM

## 2021-01-06 PROCEDURE — 82274 ASSAY TEST FOR BLOOD FECAL: CPT | Performed by: FAMILY MEDICINE

## 2021-01-09 LAB — HEMOCCULT STL QL IA: NEGATIVE

## 2021-01-15 ENCOUNTER — HEALTH MAINTENANCE LETTER (OUTPATIENT)
Age: 76
End: 2021-01-15

## 2021-04-22 ENCOUNTER — TELEPHONE (OUTPATIENT)
Dept: FAMILY MEDICINE | Facility: OTHER | Age: 76
End: 2021-04-22

## 2021-04-22 DIAGNOSIS — M81.0 AGE-RELATED OSTEOPOROSIS WITHOUT CURRENT PATHOLOGICAL FRACTURE: ICD-10-CM

## 2021-04-22 DIAGNOSIS — E66.09 CLASS 1 OBESITY DUE TO EXCESS CALORIES WITHOUT SERIOUS COMORBIDITY WITH BODY MASS INDEX (BMI) OF 34.0 TO 34.9 IN ADULT: Primary | ICD-10-CM

## 2021-04-22 DIAGNOSIS — Z12.11 SCREENING FOR COLON CANCER: ICD-10-CM

## 2021-04-22 DIAGNOSIS — E66.811 CLASS 1 OBESITY DUE TO EXCESS CALORIES WITHOUT SERIOUS COMORBIDITY WITH BODY MASS INDEX (BMI) OF 34.0 TO 34.9 IN ADULT: Primary | ICD-10-CM

## 2021-04-22 DIAGNOSIS — Z12.31 ENCOUNTER FOR SCREENING MAMMOGRAM FOR BREAST CANCER: ICD-10-CM

## 2021-04-22 NOTE — TELEPHONE ENCOUNTER
Cesia would like to do labs first please place orders she is coming before her appointment to complete this.   Sophia Morris MA

## 2021-04-22 NOTE — TELEPHONE ENCOUNTER
Due for AWV and mood follow-up as well as had high bp at last recheck. Would ideally see her in person, due for follow-up. Please schedule.  Nery Mosley MD

## 2021-04-23 NOTE — TELEPHONE ENCOUNTER
Updated all of her orders, so she can get labs, FIT, Dexa and mammo done prior if she would like.  Nery Mosley MD

## 2021-04-28 ENCOUNTER — ANCILLARY PROCEDURE (OUTPATIENT)
Dept: BONE DENSITY | Facility: CLINIC | Age: 76
End: 2021-04-28
Attending: FAMILY MEDICINE
Payer: COMMERCIAL

## 2021-04-28 ENCOUNTER — ANCILLARY PROCEDURE (OUTPATIENT)
Dept: MAMMOGRAPHY | Facility: CLINIC | Age: 76
End: 2021-04-28
Attending: FAMILY MEDICINE
Payer: COMMERCIAL

## 2021-04-28 DIAGNOSIS — Z12.31 ENCOUNTER FOR SCREENING MAMMOGRAM FOR BREAST CANCER: ICD-10-CM

## 2021-04-28 DIAGNOSIS — M81.0 AGE-RELATED OSTEOPOROSIS WITHOUT CURRENT PATHOLOGICAL FRACTURE: ICD-10-CM

## 2021-04-28 PROCEDURE — 77067 SCR MAMMO BI INCL CAD: CPT | Mod: TC | Performed by: RADIOLOGY

## 2021-04-28 PROCEDURE — 77080 DXA BONE DENSITY AXIAL: CPT | Performed by: INTERNAL MEDICINE

## 2021-04-29 NOTE — PROGRESS NOTES
"SUBJECTIVE:   Cesia Cardona is a 75 year old female who presents for Preventive Visit.    Patient has been advised of split billing requirements and indicates understanding: Yes   Are you in the first 12 months of your Medicare coverage?  No    Healthy Habits:     In general, how would you rate your overall health?  Excellent    Frequency of exercise:  1 day/week    Duration of exercise:  Less than 15 minutes    Do you usually eat at least 4 servings of fruit and vegetables a day, include whole grains    & fiber and avoid regularly eating high fat or \"junk\" foods?  No    Taking medications regularly:  Yes    Medication side effects:  Not applicable and None    Ability to successfully perform activities of daily living:  No assistance needed    Home Safety:  No safety concerns identified    Hearing Impairment:  No hearing concerns    In the past 6 months, have you been bothered by leaking of urine?  No    In general, how would you rate your overall mental or emotional health?  Good      PHQ-2 Total Score: 1    Additional concerns today:  No    Depression and Anxiety Follow-Up    How are you doing with your depression since your last visit? No change    How are you doing with your anxiety since your last visit?  No change    Are you having other symptoms that might be associated with depression or anxiety? No    Have you had a significant life event? No     Do you have any concerns with your use of alcohol or other drugs? No    Social History     Tobacco Use     Smoking status: Never Smoker     Smokeless tobacco: Never Used   Substance Use Topics     Alcohol use: Not Currently     Comment: socially- hardly      Drug use: No     PHQ 2/3/2020 9/24/2020 5/5/2021   PHQ-9 Total Score 2 4 8   Q9: Thoughts of better off dead/self-harm past 2 weeks Not at all Not at all Not at all   F/U: Thoughts of suicide or self-harm - - -   F/U: Safety concerns - - -     FARHAT-7 SCORE 2/3/2020 9/24/2020 5/5/2021   Total Score 2 (minimal " anxiety) - 0 (minimal anxiety)   Total Score 2 3 0         Suicide Assessment Five-step Evaluation and Treatment (SAFE-T)        Do you feel safe in your environment? Yes    Have you ever done Advance Care Planning? (For example, a Health Directive, POLST, or a discussion with a medical provider or your loved ones about your wishes): No, advance care planning information given to patient to review.  Patient plans to discuss their wishes with loved ones or provider.         Fall risk  Fallen 2 or more times in the past year?: No  Any fall with injury in the past year?: No    Cognitive Screening   1) Repeat 3 items (Leader, Season, Table)    2) Clock draw: NORMAL  3) 3 item recall: Recalls 1 object   Results: NORMAL clock, 1-2 items recalled: COGNITIVE IMPAIRMENT LESS LIKELY    Mini-CogTM Copyright S Jordan. Licensed by the author for use in [facility]; reprinted with permission (esther@[facility]). All rights reserved.        Reviewed and updated as needed this visit by clinical staff  Tobacco  Allergies  Meds  Problems  Med Hx  Surg Hx  Fam Hx  Soc Hx          Reviewed and updated as needed this visit by Provider  Tobacco  Allergies  Meds  Problems  Med Hx  Surg Hx  Fam Hx         Social History     Tobacco Use     Smoking status: Never Smoker     Smokeless tobacco: Never Used   Substance Use Topics     Alcohol use: Not Currently     Comment: socially- hardly        Alcohol Use 5/5/2021   Prescreen: >3 drinks/day or >7 drinks/week? No   Prescreen: >3 drinks/day or >7 drinks/week? -   AUDIT SCORE  -       Current providers sharing in care for this patient include:     Patient Care Team:  Nery Mosley MD as PCP - General (Family Practice)  Nery Mosley MD as Assigned PCP    The following health maintenance items are reviewed in Epic and correct as of today:  Health Maintenance Due   Topic Date Due     ADVANCE CARE PLANNING  Never done     DEPRESSION ACTION PLAN  Never done     ZOSTER  "IMMUNIZATION (1 of 2) Never done     FALL RISK ASSESSMENT  08/21/2020     COLORECTAL CANCER SCREENING  01/07/2021     Lab work is in process          Review of Systems  Constitutional, HEENT, cardiovascular, pulmonary, GI, , musculoskeletal, neuro, skin, endocrine and psych systems are negative, except as otherwise noted.    OBJECTIVE:   /70   Pulse 70   Temp 98.4  F (36.9  C) (Temporal)   Resp 16   Ht 1.702 m (5' 7\")   Wt 98.4 kg (217 lb)   SpO2 94%   BMI 33.99 kg/m   Estimated body mass index is 33.99 kg/m  as calculated from the following:    Height as of this encounter: 1.702 m (5' 7\").    Weight as of this encounter: 98.4 kg (217 lb).  Physical Exam  GENERAL: healthy, alert and no distress  RESP: lungs clear to auscultation - no rales, rhonchi or wheezes  CV: regular rate and rhythm, normal S1 S2, no S3 or S4, no murmur, click or rub, no peripheral edema and peripheral pulses strong  ABDOMEN: soft, nontender, no hepatosplenomegaly, no masses and bowel sounds normal  MS: no gross musculoskeletal defects noted, no edema  SKIN: no suspicious lesions or rashes  NEURO: Normal strength and tone, mentation intact and speech normal  PSYCH: mentation appears normal, affect normal/bright        ASSESSMENT / PLAN:       ICD-10-CM    1. Routine general medical examination at a health care facility  Z00.00    2. Class 1 obesity due to excess calories without serious comorbidity with body mass index (BMI) of 34.0 to 34.9 in adult  E66.09 Lipid panel reflex to direct LDL Fasting    Z68.34 Comprehensive metabolic panel   3. Encounter for Medicare annual wellness exam  Z00.00    4. Moderate major depression (H)  F32.1 citalopram (CELEXA) 20 MG tablet   5. Age-related osteoporosis without current pathological fracture  M81.0 alendronate (FOSAMAX) 70 MG tablet        Mood has been doing well and will continue with her medications currently.  Though she did occasionally feel little extra could be helpful.  We " "briefly discussed increasing to 30 mg of Celexa and decided against that at this time.  Did discuss her change in DEXA from an osteoporosis to osteopenia though very close to the osteoporosis diagnosis so we will leave her on her Fosamax for now and plan to recheck in 2 years.  Fasting labs planned and completed today as well.  In addition to preventative visit, 28 min spent on the date of the encounter in chart review, patient visit, review of tests, documentation and/or discussion with other providers about the issues documented above.       Patient has been advised of split billing requirements and indicates understanding: Yes  COUNSELING:  Reviewed preventive health counseling, as reflected in patient instructions       Regular exercise       Healthy diet/nutrition    Estimated body mass index is 33.99 kg/m  as calculated from the following:    Height as of this encounter: 1.702 m (5' 7\").    Weight as of this encounter: 98.4 kg (217 lb).    Weight management plan: Discussed healthy diet and exercise guidelines    She reports that she has never smoked. She has never used smokeless tobacco.      Appropriate preventive services were discussed with this patient, including applicable screening as appropriate for cardiovascular disease, diabetes, osteopenia/osteoporosis, and glaucoma.  As appropriate for age/gender, discussed screening for colorectal cancer, prostate cancer, breast cancer, and cervical cancer. Checklist reviewing preventive services available has been given to the patient.    Reviewed patients plan of care and provided an AVS. The Basic Care Plan (routine screening as documented in Health Maintenance) for Cesia meets the Care Plan requirement. This Care Plan has been established and reviewed with the Patient.    Counseling Resources:  ATP IV Guidelines  Pooled Cohorts Equation Calculator  Breast Cancer Risk Calculator  Breast Cancer: Medication to Reduce Risk  FRAX Risk Assessment  ICSI Preventive " Guidelines  Dietary Guidelines for Americans, 2010  USDA's MyPlate  ASA Prophylaxis  Lung CA Screening    Nery Mosley MD, MD  [facility]    Identified Health Risks:  Answers for HPI/ROS submitted by the patient on 5/5/2021   Annual Exam:  If you checked off any problems, how difficult have these problems made it for you to do your work, take care of things at home, or get along with other people?: Not difficult at all  PHQ9 TOTAL SCORE: 8  FARHAT 7 TOTAL SCORE: 0

## 2021-04-29 NOTE — PATIENT INSTRUCTIONS
Preventive Health Recommendations    See your health care provider every year to    Review health changes.     Discuss preventive care.      Review your medicines if your doctor has prescribed any.      You no longer need a yearly Pap test unless you've had an abnormal Pap test in the past 10 years. If you have vaginal symptoms, such as bleeding or discharge, be sure to talk with your provider about a Pap test.      Every 1 to 2 years, have a mammogram.  If you are over 69, talk with your health care provider about whether or not you want to continue having screening mammograms.      Every 10 years, have a colonoscopy. Or, have a yearly FIT test (stool test). These exams will check for colon cancer.       Have a cholesterol test every 5 years, or more often if your doctor advises it.       Have a diabetes test (fasting glucose) every three years. If you are at risk for diabetes, you should have this test more often.       At age 65, have a bone density scan (DEXA) to check for osteoporosis (brittle bone disease).    Shots:    Get a flu shot each year.    Get a tetanus shot every 10 years.    Talk to your doctor about your pneumonia vaccines. There are now two you should receive - Pneumovax (PPSV 23) and Prevnar (PCV 13).    Talk to your pharmacist about the shingles vaccine.    Talk to your doctor about the hepatitis B vaccine.    Nutrition:     Eat at least 5 servings of fruits and vegetables each day.      Eat whole-grain bread, whole-wheat pasta and brown rice instead of white grains and rice.      Get adequate about Calcium and Vitamin D.     Lifestyle    Exercise at least 150 minutes a week (30 minutes a day, 5 days a week). This will help you control your weight and prevent disease.      Limit alcohol to one drink per day.      No smoking.       Wear sunscreen to prevent skin cancer.       See your dentist twice a year for an exam and cleaning.      See your eye doctor every 1 to 2 years to screen for  conditions such as glaucoma, macular degeneration, cataracts, etc.    Personalized Prevention Plan  You are due for the preventive services outlined below.  Your care team is available to assist you in scheduling these services.  If you have already completed any of these items, please share that information with your care team to update in your medical record.    Health Maintenance Due   Topic Date Due     ANNUAL REVIEW OF HM ORDERS  Never done     Discuss Advance Care Planning  Never done     Depression Action Plan  Never done     Zoster (Shingles) Vaccine (1 of 2) Never done     Annual Wellness Visit  08/21/2020     FALL RISK ASSESSMENT  08/21/2020     Colorectal Cancer Screening  01/07/2021     Osteoporosis Screening  02/19/2021     Depression Assessment  03/24/2021     Patient Education   Personalized Prevention Plan  You are due for the preventive services outlined below.  Your care team is available to assist you in scheduling these services.  If you have already completed any of these items, please share that information with your care team to update in your medical record.  Health Maintenance Due   Topic Date Due     ANNUAL REVIEW OF HM ORDERS  Never done     Discuss Advance Care Planning  Never done     Depression Action Plan  Never done     Zoster (Shingles) Vaccine (1 of 2) Never done     FALL RISK ASSESSMENT  08/21/2020     Colorectal Cancer Screening  01/07/2021       Exercise for a Healthier Heart  You may wonder how you can improve the health of your heart. If you re thinking about exercise, you re on the right track. You don t need to become an athlete. But you do need a certain amount of brisk exercise to help strengthen your heart. If you have been diagnosed with a heart condition, your healthcare provider may advise exercise to help stabilize your condition. To help make exercise a habit, choose safe, fun activities.      Exercise with a friend. When activity is fun, you're more likely to stick  with it.   Before you start  Check with your healthcare provider before starting an exercise program. This is especially important if you have not been active for a while. It's also important if you have a long-term (chronic) health problem such as heart disease, diabetes, or obesity. Or if you are at high risk for having these problems.   Why exercise?  Exercising regularly offers many healthy rewards. It can help you do all of the following:     Improve your blood cholesterol level to help prevent further heart trouble    Lower your blood pressure to help prevent a stroke or heart attack    Control diabetes, or reduce your risk of getting this disease    Improve your heart and lung function    Reach and stay at a healthy weight    Make your muscles stronger so you can stay active    Prevent falls and fractures by slowing the loss of bone mass (osteoporosis)    Manage stress better    Reduce your blood pressure    Improve your sense of self and your body image  Exercise tips      Ease into your routine. Set small goals. Then build on them. If you are not sure what your activity level should be, talk with your healthcare provider first before starting an exercise routine.    Exercise on most days. Aim for a total of 150 minutes (2 hours and 30 minutes) or more of moderate-intensity aerobic activity each week. Or 75 minutes (1 hour and 15 minutes) or more of vigorous-intensity aerobic activity each week. Or try for a combination of both. Moderate activity means that you breathe heavier and your heart rate increases but you can still talk. Think about doing 40 minutes of moderate exercise, 3 to 4 times a week. For best results, activity should last for about 40 minutes to lower blood pressure and cholesterol. It's OK to work up to the 40-minute period over time. Examples of moderate-intensity activity are walking 1 mile in 15 minutes. Or doing 30 to 45 minutes of yard work.    Step up your daily activity level.  Along  with your exercise program, try being more active the whole day. Walk instead of drive. Or park further away so that you take more steps each day. Do more household tasks or yard work. You may not be able to meet the advised mount of physical activity. But doing some moderate- or vigorous-intensity aerobic activity can help reduce your risk for heart disease. Your healthcare provider can help you figure out what is best for you.    Choose 1 or more activities you enjoy.  Walking is one of the easiest things you can do. You can also try swimming, riding a bike, dancing, or taking an exercise class.    When to call your healthcare provider  Call your healthcare provider if you have any of these:     Chest pain or feel dizzy or lightheaded    Burning, tightness, pressure, or heaviness in your chest, neck, shoulders, back, or arms    Abnormal shortness of breath    More joint or muscle pain    A very fast or irregular heartbeat (palpitations)  [employer] last reviewed this educational content on 7/1/2019 2000-2021 [employer]. All rights reserved. This information is not intended as a substitute for professional medical care. Always follow your healthcare professional's instructions.          Understanding USDA MyPlate  The USDA has guidelines to help you make healthy food choices. These are called MyPlate. MyPlate shows the food groups that make up healthy meals using the image of a place setting. Before you eat, think about the healthiest choices for what to put on your plate or in your cup or bowl. To learn more about building a healthy plate, visit www.choosemyplate.gov.    The food groups    Fruits. Any fruit or 100% fruit juice counts as part of the Fruit Group. Fruits may be fresh, canned, frozen, or dried, and may be whole, cut-up, or pureed. Make 1/2 of your plate fruits and vegetables.    Vegetables. Any vegetable or 100% vegetable juice counts as a member of the Vegetable Group. Vegetables may  be fresh, frozen, canned, or dried. They can be served raw or cooked and may be whole, cut-up, or mashed. Make 1/2 of your plate fruits and vegetables.    Grains. All foods made from grains are part of the Grains Group. These include wheat, rice, oats, cornmeal, and barley. Grains are often used to make foods such as bread, pasta, oatmeal, cereal, tortillas, and grits. Grains should be no more than 1/4 of your plate. At least half of your grains should be whole grains.    Protein. This group includes meat, poultry, seafood, beans and peas, eggs, processed soy products (such as tofu), nuts (including nut butters), and seeds. Make protein choices no more than 1/4 of your plate. Meat and poultry choices should be lean or low fat.    Dairy. The Dairy Group includes all fluid milk products and foods made from milk that contain calcium, such as yogurt and cheese. (Foods that have little calcium, such as cream, butter, and cream cheese, are not part of this group.) Most dairy choices should be low-fat or fat-free.    Oils. Oils aren't a food group, but they do contain essential nutrients. However it's important to watch your intake of oils. These are fats that are liquid at room temperature. They include canola, corn, olive, soybean, vegetable, and sunflower oil. Foods that are mainly oil include mayonnaise, certain salad dressings, and soft margarines. You likely already get your daily oil allowance from the foods you eat.  Things to limit  Eating healthy also means limiting these things in your diet:       Salt (sodium). Many processed foods have a lot of sodium. To keep sodium intake down, eat fresh vegetables, meats, poultry, and seafood when possible. Purchase low-sodium, reduced-sodium, or no-salt-added food products at the store. And don't add salt to your meals at home. Instead, season them with herbs and spices such as dill, oregano, cumin, and paprika. Or try adding flavor with lemon or lime zest and  juice.    Saturated fat. Saturated fats are most often found in animal products such as beef, pork, and chicken. They are often solid at room temperature, such as butter. To reduce your saturated fat intake, choose leaner cuts of meat and poultry. And try healthier cooking methods such as grilling, broiling, roasting, or baking. For a simple lower-fat swap, use plain nonfat yogurt instead of mayonnaise when making potato salad or macaroni salad.    Added sugars. These are sugars added to foods. They are in foods such as ice cream, candy, soda, fruit drinks, sports drinks, energy drinks, cookies, pastries, jams, and syrups. Cut down on added sugars by sharing sweet treats with a family member or friend. You can also choose fruit for dessert, and drink water or other unsweetened beverages.     [employer] last reviewed this educational content on 6/1/2020 2000-2021 [employer]. All rights reserved. This information is not intended as a substitute for professional medical care. Always follow your healthcare professional's instructions.          Depression and Suicide in Older Adults    Nearly 2 million older Americans have some type of depression. Some of them even take their own lives. Yet depression among older adults is often ignored. Learn the warning signs. You may help spare a loved one needless pain. You may also save a life.   What is depression?  Depression is a common and serious illness that affects the way you think and feel. It is not a normal part of aging, nor is it a sign of weakness, a character flaw, or something you can snap out of. Most people with depression need treatment to get better. The most common symptom is a feeling of deep sadness. People who are depressed also may seem tired and listless. And nothing seems to give them pleasure. It s normal to grieve or be sad sometimes. But sadness lessens or passes with time. Depression rarely goes away or improves on its own. A person  "with clinical depression can't \"snap out of it.\" Other symptoms of depression are:     Sleeping more or less than normal    Eating more or less than normal    Having headaches, stomachaches, or other pains that don t go away    Feeling nervous,  empty,  or worthless    Crying a great deal    Thinking or talking about suicide or death    Loss of interest in activities previously enjoyed    Social isolation    Feeling confused or forgetful  What causes it?  The causes of depression aren t fully known. But it is thought to result from a complex blend of these factors:     Biochemistry. Certain chemicals in the brain play a role.    Genes. Depression does run in families.    Life stress. Life stresses can also trigger depression in some people. Older adults often face many stressors, such as death of friends or a spouse, health problems, and financial concerns.    Chronic conditions. This includes conditions such as diabetes, heart disease, or cancer. These can cause symptoms of depression. Medicine side effects can cause changes in thoughts and behaviors.  How you can help  Often, depressed people may not want to ask for help. When they do, they may be ignored. Or, they may receive the wrong treatment. You can help by showing parents and older friends love and support. If they seem depressed, don t lecture the person, ignore the symptoms, or discount the symptoms as a  normal  part of aging -which they are not. Get involved, listen, and show interest and support.   Help them understand that depression is a treatable illness. Tell them you can help them find the right treatment. Offer to go to their healthcare provider's appointment with them for support when the symptoms are discussed. With their approval, contact a local mental health center, social service agency, or hospital about services.   You can be an advocate for him or her at healthcare appointments. Many older adults have chronic illnesses that can cause " symptoms of depression. Medicine side effects can change thoughts and behaviors. You can help make sure that the healthcare provider looks at all of these factors. He or she should refer your family member or friend to a mental healthcare provider when needed. in some cases, untreated depression can lead to a misdiagnosis. A person may be diagnosed with a brain disorder such as dementia. If the healthcare provider does not take the issue of depression seriously, help your family member or friend to find another provider.   Don't be afraid to ask  If you think an older person you care about could be suicidal, ask,  Have you thought about suicide?  Most people will tell you the truth. If they say  yes,  they may already have a plan for how and when they will attempt it. Find out as much as you can. The more detailed the plan, and the easier it is to carry out, the more danger the person is in right now. Tell the person you are there for them and do not want them to harm him or herself. Don't wait to get help for the person. Call the person's healthcare provider, local hospital, or emergency services.   To learn more    National Suicide Prevention Lifeline (crisis hotline) 274-340-ALHN (793-197-3572)    National [place] of Mental Agxcew410-297-3808zvf.nimh.nih.gov    National [place] on Mental Ijbhibs890-847-9578ftn.krystian.org    Mental Health Nrbivnz919-444-8345gpz.[facility].org    National Suicide Vennfto076-EKPCFLM (753-358-5265)    Call 911  Never leave the person alone. A person who is actively suicidal needs psychiatric care right away. They will need constant supervision. Never leave the person out of sight. Call 911 or the national 24-hour suicide crisis hotline at 822-740-EBTL (238-348-6228). You can also take the person to the closest emergency room.   [employer] last reviewed this educational content on 5/1/2020 2000-2021 [employer]. All rights reserved. This information is not intended as a  substitute for professional medical care. Always follow your healthcare professional's instructions.

## 2021-05-05 ENCOUNTER — OFFICE VISIT (OUTPATIENT)
Dept: FAMILY MEDICINE | Facility: OTHER | Age: 76
End: 2021-05-05
Payer: COMMERCIAL

## 2021-05-05 VITALS
RESPIRATION RATE: 16 BRPM | DIASTOLIC BLOOD PRESSURE: 70 MMHG | SYSTOLIC BLOOD PRESSURE: 120 MMHG | WEIGHT: 217 LBS | TEMPERATURE: 98.4 F | OXYGEN SATURATION: 94 % | HEIGHT: 67 IN | HEART RATE: 70 BPM | BODY MASS INDEX: 34.06 KG/M2

## 2021-05-05 DIAGNOSIS — E66.811 CLASS 1 OBESITY DUE TO EXCESS CALORIES WITHOUT SERIOUS COMORBIDITY WITH BODY MASS INDEX (BMI) OF 34.0 TO 34.9 IN ADULT: ICD-10-CM

## 2021-05-05 DIAGNOSIS — M81.0 AGE-RELATED OSTEOPOROSIS WITHOUT CURRENT PATHOLOGICAL FRACTURE: ICD-10-CM

## 2021-05-05 DIAGNOSIS — F32.1 MODERATE MAJOR DEPRESSION (H): ICD-10-CM

## 2021-05-05 DIAGNOSIS — Z00.00 ENCOUNTER FOR MEDICARE ANNUAL WELLNESS EXAM: ICD-10-CM

## 2021-05-05 DIAGNOSIS — Z00.00 ROUTINE GENERAL MEDICAL EXAMINATION AT A HEALTH CARE FACILITY: Primary | ICD-10-CM

## 2021-05-05 DIAGNOSIS — E66.09 CLASS 1 OBESITY DUE TO EXCESS CALORIES WITHOUT SERIOUS COMORBIDITY WITH BODY MASS INDEX (BMI) OF 34.0 TO 34.9 IN ADULT: ICD-10-CM

## 2021-05-05 LAB
ALBUMIN SERPL-MCNC: 4.1 G/DL (ref 3.4–5)
ALP SERPL-CCNC: 72 U/L (ref 40–150)
ALT SERPL W P-5'-P-CCNC: 26 U/L (ref 0–50)
ANION GAP SERPL CALCULATED.3IONS-SCNC: 5 MMOL/L (ref 3–14)
AST SERPL W P-5'-P-CCNC: 13 U/L (ref 0–45)
BILIRUB SERPL-MCNC: 0.4 MG/DL (ref 0.2–1.3)
BUN SERPL-MCNC: 17 MG/DL (ref 7–30)
CALCIUM SERPL-MCNC: 8.7 MG/DL (ref 8.5–10.1)
CHLORIDE SERPL-SCNC: 109 MMOL/L (ref 94–109)
CHOLEST SERPL-MCNC: 193 MG/DL
CO2 SERPL-SCNC: 28 MMOL/L (ref 20–32)
CREAT SERPL-MCNC: 0.68 MG/DL (ref 0.52–1.04)
GFR SERPL CREATININE-BSD FRML MDRD: 86 ML/MIN/{1.73_M2}
GLUCOSE SERPL-MCNC: 103 MG/DL (ref 70–99)
HDLC SERPL-MCNC: 60 MG/DL
LDLC SERPL CALC-MCNC: 109 MG/DL
NONHDLC SERPL-MCNC: 133 MG/DL
POTASSIUM SERPL-SCNC: 4.1 MMOL/L (ref 3.4–5.3)
PROT SERPL-MCNC: 7.1 G/DL (ref 6.8–8.8)
SODIUM SERPL-SCNC: 142 MMOL/L (ref 133–144)
TRIGL SERPL-MCNC: 119 MG/DL

## 2021-05-05 PROCEDURE — 99213 OFFICE O/P EST LOW 20 MIN: CPT | Mod: 25 | Performed by: FAMILY MEDICINE

## 2021-05-05 PROCEDURE — 80053 COMPREHEN METABOLIC PANEL: CPT | Performed by: FAMILY MEDICINE

## 2021-05-05 PROCEDURE — 36415 COLL VENOUS BLD VENIPUNCTURE: CPT | Performed by: FAMILY MEDICINE

## 2021-05-05 PROCEDURE — 99397 PER PM REEVAL EST PAT 65+ YR: CPT | Performed by: FAMILY MEDICINE

## 2021-05-05 PROCEDURE — 80061 LIPID PANEL: CPT | Performed by: FAMILY MEDICINE

## 2021-05-05 RX ORDER — LORATADINE 10 MG/1
10 CAPSULE, LIQUID FILLED ORAL DAILY
COMMUNITY

## 2021-05-05 RX ORDER — CITALOPRAM HYDROBROMIDE 20 MG/1
20 TABLET ORAL DAILY
Qty: 90 TABLET | Refills: 1 | Status: SHIPPED | OUTPATIENT
Start: 2021-05-05 | End: 2021-10-12

## 2021-05-05 RX ORDER — ALENDRONATE SODIUM 70 MG/1
TABLET ORAL
Qty: 12 TABLET | Refills: 3 | Status: SHIPPED | OUTPATIENT
Start: 2021-05-05 | End: 2022-06-23

## 2021-05-05 ASSESSMENT — ENCOUNTER SYMPTOMS
NERVOUS/ANXIOUS: 0
WEAKNESS: 0
HEADACHES: 0
ARTHRALGIAS: 0
NAUSEA: 0
HEMATOCHEZIA: 0
BREAST MASS: 0
SORE THROAT: 0
HEARTBURN: 0
DIARRHEA: 0
EYE PAIN: 0
CHILLS: 0
PARESTHESIAS: 0
SHORTNESS OF BREATH: 0
CONSTIPATION: 0
COUGH: 0
MYALGIAS: 0
ABDOMINAL PAIN: 0
DYSURIA: 0
DIZZINESS: 0
HEMATURIA: 0
PALPITATIONS: 0
JOINT SWELLING: 1
FREQUENCY: 0
FEVER: 0

## 2021-05-05 ASSESSMENT — ANXIETY QUESTIONNAIRES
5. BEING SO RESTLESS THAT IT IS HARD TO SIT STILL: NOT AT ALL
4. TROUBLE RELAXING: NOT AT ALL
3. WORRYING TOO MUCH ABOUT DIFFERENT THINGS: NOT AT ALL
GAD7 TOTAL SCORE: 0
GAD7 TOTAL SCORE: 0
7. FEELING AFRAID AS IF SOMETHING AWFUL MIGHT HAPPEN: NOT AT ALL
7. FEELING AFRAID AS IF SOMETHING AWFUL MIGHT HAPPEN: NOT AT ALL
GAD7 TOTAL SCORE: 0
6. BECOMING EASILY ANNOYED OR IRRITABLE: NOT AT ALL
2. NOT BEING ABLE TO STOP OR CONTROL WORRYING: NOT AT ALL
1. FEELING NERVOUS, ANXIOUS, OR ON EDGE: NOT AT ALL

## 2021-05-05 ASSESSMENT — MIFFLIN-ST. JEOR: SCORE: 1521.94

## 2021-05-05 ASSESSMENT — PATIENT HEALTH QUESTIONNAIRE - PHQ9
SUM OF ALL RESPONSES TO PHQ QUESTIONS 1-9: 8
10. IF YOU CHECKED OFF ANY PROBLEMS, HOW DIFFICULT HAVE THESE PROBLEMS MADE IT FOR YOU TO DO YOUR WORK, TAKE CARE OF THINGS AT HOME, OR GET ALONG WITH OTHER PEOPLE: NOT DIFFICULT AT ALL
SUM OF ALL RESPONSES TO PHQ QUESTIONS 1-9: 8

## 2021-05-05 ASSESSMENT — ACTIVITIES OF DAILY LIVING (ADL): CURRENT_FUNCTION: NO ASSISTANCE NEEDED

## 2021-05-05 ASSESSMENT — PAIN SCALES - GENERAL: PAINLEVEL: NO PAIN (0)

## 2021-05-05 NOTE — PROGRESS NOTES
She is at risk for lack of exercise and has been provided with information to increase physical activity for the benefit of her well-being.  The patient was counseled and encouraged to consider modifying their diet and eating habits. She was provided with information on recommended healthy diet options.  The patient's PHQ-9 score is consistent with known mild depression.

## 2021-05-06 ASSESSMENT — ANXIETY QUESTIONNAIRES: GAD7 TOTAL SCORE: 0

## 2021-09-05 ENCOUNTER — HEALTH MAINTENANCE LETTER (OUTPATIENT)
Age: 76
End: 2021-09-05

## 2021-10-11 DIAGNOSIS — F32.1 MODERATE MAJOR DEPRESSION (H): ICD-10-CM

## 2021-10-12 RX ORDER — CITALOPRAM HYDROBROMIDE 20 MG/1
TABLET ORAL
Qty: 90 TABLET | Refills: 0 | Status: SHIPPED | OUTPATIENT
Start: 2021-10-12 | End: 2022-07-27

## 2021-10-12 NOTE — TELEPHONE ENCOUNTER
Pending Prescriptions:                       Disp   Refills    citalopram (CELEXA) 20 MG tablet [Pharmacy*90 tab*1        Sig: TAKE 1 TABLET BY MOUTH EVERY DAY    Routing refill request to provider for review/approval because:  Labs not current:  PHQ9  PHQ-9 score:    PHQ 5/5/2021   PHQ-9 Total Score 8   Q9: Thoughts of better off dead/self-harm past 2 weeks Not at all   F/U: Thoughts of suicide or self-harm -   F/U: Safety concerns -

## 2021-10-12 NOTE — TELEPHONE ENCOUNTER
Due for appt. Arlette given, please schedule. (Phone, ov, or evisit as appropriate).  Nery Mosley MD

## 2021-11-19 NOTE — PROGRESS NOTES
"  Assessment & Plan     Moderate major depression (H)  - Patient is here today for a medication check and refill  - This time of year is more difficult for her and she would like to increase her Celexa dose  - Discussed with her increasing citalopram (CELEXA) to 30 mg for one week to see how she tolerates as she has been increasing her dose at times. If she feels this dose to be unhelpful, she may increase to 40 mg daily as long as she is not having any side effects. We discussed risks with higher doses of Citalopram including QTc prolongation and should make sure if prescribed other medications the prescribing provider is aware she is on this medication.   - Recommend that she take the higher dose daily for the next couple of months  - She will let us know if she has any questions or concerns with increasing this medication       BMI:   Estimated body mass index is 34.14 kg/m  as calculated from the following:    Height as of this encounter: 1.702 m (5' 7.01\").    Weight as of this encounter: 98.9 kg (218 lb).         Patient is in agreement with this plan and will return to clinic if there are new concerns.    I, Tong Coyne PA-C, was present with the DNP  student who participated in the service and in the documentation of the note.  I have verified the history and personally performed the physical exam and medical decision making.  I agree with the assessment and plan of care as documented in the note.     Leoncio Burnett, DNP Student    Tong Coyne PA-C  [facility] DAV Callaway is a 76 year old who presents for the following health issues     History of Present Illness       Mental Health Follow-up:  Patient presents to follow-up on Depression & Anxiety.Patient's depression since last visit has been:  Good  The patient is not having other symptoms associated with depression.  Patient's anxiety since last visit has been:  Good  The patient is not having other symptoms " "associated with anxiety.  Any significant life events: No  Patient is not feeling anxious or having panic attacks.  Patient has no concerns about alcohol or drug use.     Social History  Tobacco Use    Smoking status: Never Smoker    Smokeless tobacco: Never Used  Vaping Use    Vaping Use: Never used  Alcohol use: Not Currently    Comment: socially- hardly   Drug use: No      Today's PHQ-9         PHQ-9 Total Score:     2   PHQ-9 Q9 Thoughts of better off dead/self-harm past 2 weeks :   Not at all   Thoughts of suicide or self harm:      Self-harm Plan:        Self-harm Action:          Safety concerns for self or others:           She eats 0-1 servings of fruits and vegetables daily.She consumes 0 sweetened beverage(s) daily.She exercises with enough effort to increase her heart rate 9 or less minutes per day.  She exercises with enough effort to increase her heart rate 3 or less days per week.   She is taking medications regularly.       Answers for HPI/ROS submitted by the patient on 11/22/2021  If you checked off any problems, how difficult have these problems made it for you to do your work, take care of things at home, or get along with other people?: Somewhat difficult  PHQ9 TOTAL SCORE: 2  FARHAT 7 TOTAL SCORE: 1    Here today for a med update and change- Would like to up her dose of Celexa  No side effects, has been increasing to 30-40 mg at times. Feels this is helpful.  Discussed increasing to 30 mg at a previous visit but did not want to at that time. However, the upcoming months are difficult for her and she would like to have increased.        Review of Systems   Psychiatric/Behavioral: Positive for mood changes. The patient is nervous/anxious.    All other systems reviewed and are negative.             Objective    /70   Pulse 64   Temp 98.8  F (37.1  C) (Temporal)   Resp 16   Ht 1.702 m (5' 7.01\")   Wt 98.9 kg (218 lb)   SpO2 96%   BMI 34.14 kg/m    Body mass index is 34.14 kg/m . "     Physical Exam  Constitutional:       Appearance: Normal appearance.   Cardiovascular:      Rate and Rhythm: Normal rate and regular rhythm.      Pulses: Normal pulses.      Heart sounds: Normal heart sounds.   Pulmonary:      Effort: Pulmonary effort is normal.      Breath sounds: Normal breath sounds.   Abdominal:      General: Bowel sounds are normal.      Palpations: Abdomen is soft.   Skin:     General: Skin is warm and dry.   Neurological:      General: No focal deficit present.      Mental Status: She is alert and oriented to person, place, and time.   Psychiatric:         Mood and Affect: Mood and affect normal.         Behavior: Behavior normal.         Thought Content: Thought content normal.         Judgment: Judgment normal.

## 2021-11-22 ENCOUNTER — OFFICE VISIT (OUTPATIENT)
Dept: FAMILY MEDICINE | Facility: OTHER | Age: 76
End: 2021-11-22
Payer: COMMERCIAL

## 2021-11-22 VITALS
SYSTOLIC BLOOD PRESSURE: 134 MMHG | DIASTOLIC BLOOD PRESSURE: 70 MMHG | WEIGHT: 218 LBS | TEMPERATURE: 98.8 F | OXYGEN SATURATION: 96 % | BODY MASS INDEX: 34.21 KG/M2 | RESPIRATION RATE: 16 BRPM | HEIGHT: 67 IN | HEART RATE: 64 BPM

## 2021-11-22 DIAGNOSIS — F32.1 MODERATE MAJOR DEPRESSION (H): Primary | ICD-10-CM

## 2021-11-22 PROCEDURE — 99213 OFFICE O/P EST LOW 20 MIN: CPT | Performed by: PHYSICIAN ASSISTANT

## 2021-11-22 RX ORDER — MULTIVITAMIN WITH IRON
2400 TABLET ORAL
COMMUNITY
End: 2022-07-27

## 2021-11-22 RX ORDER — CITALOPRAM HYDROBROMIDE 40 MG/1
40 TABLET ORAL DAILY
Qty: 90 TABLET | Refills: 1 | Status: SHIPPED | OUTPATIENT
Start: 2021-11-22 | End: 2022-05-25

## 2021-11-22 RX ORDER — BIOTIN 1 MG
1000 TABLET ORAL DAILY
COMMUNITY

## 2021-11-22 ASSESSMENT — MIFFLIN-ST. JEOR: SCORE: 1521.59

## 2021-11-22 ASSESSMENT — ANXIETY QUESTIONNAIRES
GAD7 TOTAL SCORE: 1
GAD7 TOTAL SCORE: 1
2. NOT BEING ABLE TO STOP OR CONTROL WORRYING: NOT AT ALL
3. WORRYING TOO MUCH ABOUT DIFFERENT THINGS: NOT AT ALL
7. FEELING AFRAID AS IF SOMETHING AWFUL MIGHT HAPPEN: NOT AT ALL
GAD7 TOTAL SCORE: 1
8. IF YOU CHECKED OFF ANY PROBLEMS, HOW DIFFICULT HAVE THESE MADE IT FOR YOU TO DO YOUR WORK, TAKE CARE OF THINGS AT HOME, OR GET ALONG WITH OTHER PEOPLE?: NOT DIFFICULT AT ALL
7. FEELING AFRAID AS IF SOMETHING AWFUL MIGHT HAPPEN: NOT AT ALL
6. BECOMING EASILY ANNOYED OR IRRITABLE: SEVERAL DAYS
5. BEING SO RESTLESS THAT IT IS HARD TO SIT STILL: NOT AT ALL
1. FEELING NERVOUS, ANXIOUS, OR ON EDGE: NOT AT ALL
4. TROUBLE RELAXING: NOT AT ALL

## 2021-11-22 ASSESSMENT — ENCOUNTER SYMPTOMS: NERVOUS/ANXIOUS: 1

## 2021-11-22 ASSESSMENT — PATIENT HEALTH QUESTIONNAIRE - PHQ9
SUM OF ALL RESPONSES TO PHQ QUESTIONS 1-9: 2
10. IF YOU CHECKED OFF ANY PROBLEMS, HOW DIFFICULT HAVE THESE PROBLEMS MADE IT FOR YOU TO DO YOUR WORK, TAKE CARE OF THINGS AT HOME, OR GET ALONG WITH OTHER PEOPLE: SOMEWHAT DIFFICULT
SUM OF ALL RESPONSES TO PHQ QUESTIONS 1-9: 2

## 2021-11-23 ASSESSMENT — PATIENT HEALTH QUESTIONNAIRE - PHQ9: SUM OF ALL RESPONSES TO PHQ QUESTIONS 1-9: 2

## 2021-11-23 ASSESSMENT — ANXIETY QUESTIONNAIRES: GAD7 TOTAL SCORE: 1

## 2022-01-04 DIAGNOSIS — F32.1 MODERATE MAJOR DEPRESSION (H): ICD-10-CM

## 2022-01-05 RX ORDER — CITALOPRAM HYDROBROMIDE 20 MG/1
TABLET ORAL
Qty: 90 TABLET | Refills: 0 | OUTPATIENT
Start: 2022-01-05

## 2022-05-25 DIAGNOSIS — F32.1 MODERATE MAJOR DEPRESSION (H): ICD-10-CM

## 2022-05-25 RX ORDER — CITALOPRAM HYDROBROMIDE 40 MG/1
TABLET ORAL
Qty: 90 TABLET | Refills: 0 | Status: SHIPPED | OUTPATIENT
Start: 2022-05-25 | End: 2022-07-27

## 2022-06-12 ENCOUNTER — HEALTH MAINTENANCE LETTER (OUTPATIENT)
Age: 77
End: 2022-06-12

## 2022-07-27 ENCOUNTER — DOCUMENTATION ONLY (OUTPATIENT)
Dept: LAB | Facility: OTHER | Age: 77
End: 2022-07-27

## 2022-07-27 ENCOUNTER — OFFICE VISIT (OUTPATIENT)
Dept: FAMILY MEDICINE | Facility: OTHER | Age: 77
End: 2022-07-27
Payer: COMMERCIAL

## 2022-07-27 VITALS
WEIGHT: 212.5 LBS | OXYGEN SATURATION: 96 % | TEMPERATURE: 97.7 F | DIASTOLIC BLOOD PRESSURE: 70 MMHG | HEART RATE: 57 BPM | SYSTOLIC BLOOD PRESSURE: 110 MMHG | BODY MASS INDEX: 33.35 KG/M2 | HEIGHT: 67 IN | RESPIRATION RATE: 20 BRPM

## 2022-07-27 DIAGNOSIS — Z00.00 ENCOUNTER FOR MEDICARE ANNUAL WELLNESS EXAM: Primary | ICD-10-CM

## 2022-07-27 DIAGNOSIS — M81.0 AGE-RELATED OSTEOPOROSIS WITHOUT CURRENT PATHOLOGICAL FRACTURE: ICD-10-CM

## 2022-07-27 DIAGNOSIS — F32.1 MODERATE MAJOR DEPRESSION (H): ICD-10-CM

## 2022-07-27 DIAGNOSIS — Z12.11 SCREENING FOR COLON CANCER: ICD-10-CM

## 2022-07-27 PROCEDURE — G0439 PPPS, SUBSEQ VISIT: HCPCS | Performed by: FAMILY MEDICINE

## 2022-07-27 RX ORDER — CLOBETASOL PROPIONATE 0.5 MG/ML
SOLUTION TOPICAL
COMMUNITY
Start: 2022-04-04

## 2022-07-27 RX ORDER — CITALOPRAM HYDROBROMIDE 40 MG/1
40 TABLET ORAL DAILY
Qty: 90 TABLET | Refills: 1 | Status: SHIPPED | OUTPATIENT
Start: 2022-07-27 | End: 2023-04-25

## 2022-07-27 RX ORDER — ALENDRONATE SODIUM 70 MG/1
TABLET ORAL
Qty: 12 TABLET | Refills: 3 | Status: SHIPPED | OUTPATIENT
Start: 2022-07-27 | End: 2023-09-11

## 2022-07-27 RX ORDER — FLUOCINONIDE 0.5 MG/G
OINTMENT TOPICAL
COMMUNITY
Start: 2021-12-15

## 2022-07-27 RX ORDER — METRONIDAZOLE 7.5 MG/G
GEL TOPICAL
COMMUNITY
Start: 2021-12-15

## 2022-07-27 RX ORDER — KETOCONAZOLE 20 MG/G
CREAM TOPICAL
COMMUNITY
Start: 2021-12-15

## 2022-07-27 ASSESSMENT — ENCOUNTER SYMPTOMS
PALPITATIONS: 0
COUGH: 0
HEMATOCHEZIA: 0
MYALGIAS: 0
NERVOUS/ANXIOUS: 0
DIARRHEA: 0
FREQUENCY: 0
CONSTIPATION: 0
DYSURIA: 0
HEADACHES: 0
CHILLS: 0
ARTHRALGIAS: 0
BREAST MASS: 0
HEARTBURN: 0
NAUSEA: 0
FEVER: 0
JOINT SWELLING: 0
DIZZINESS: 0
PARESTHESIAS: 0
ABDOMINAL PAIN: 0
SHORTNESS OF BREATH: 0
WEAKNESS: 0
EYE PAIN: 0
SORE THROAT: 0
HEMATURIA: 0

## 2022-07-27 ASSESSMENT — PATIENT HEALTH QUESTIONNAIRE - PHQ9
SUM OF ALL RESPONSES TO PHQ QUESTIONS 1-9: 6
SUM OF ALL RESPONSES TO PHQ QUESTIONS 1-9: 6
10. IF YOU CHECKED OFF ANY PROBLEMS, HOW DIFFICULT HAVE THESE PROBLEMS MADE IT FOR YOU TO DO YOUR WORK, TAKE CARE OF THINGS AT HOME, OR GET ALONG WITH OTHER PEOPLE: NOT DIFFICULT AT ALL

## 2022-07-27 ASSESSMENT — ACTIVITIES OF DAILY LIVING (ADL): CURRENT_FUNCTION: NO ASSISTANCE NEEDED

## 2022-07-27 NOTE — PROGRESS NOTES
She is at risk for lack of exercise and has been provided with information to increase physical activity for the benefit of her well-being.  The patient was counseled and encouraged to consider modifying their diet and eating habits. She was provided with information on recommended healthy diet options.  Meds increased follow-up 6 months

## 2022-07-27 NOTE — PROGRESS NOTES
"SUBJECTIVE:   Cesia Cardona is a 76 year old female who presents for Preventive Visit.      Patient has been advised of split billing requirements and indicates understanding: Yes  Are you in the first 12 months of your Medicare coverage?  No    Healthy Habits:     In general, how would you rate your overall health?  Excellent    Frequency of exercise:  1 day/week    Duration of exercise:  Less than 15 minutes    Do you usually eat at least 4 servings of fruit and vegetables a day, include whole grains    & fiber and avoid regularly eating high fat or \"junk\" foods?  No    Taking medications regularly:  Yes    Medication side effects:  None    Ability to successfully perform activities of daily living:  No assistance needed    Home Safety:  No safety concerns identified    Hearing Impairment:  No hearing concerns    In the past 6 months, have you been bothered by leaking of urine?  No    In general, how would you rate your overall mental or emotional health?  Good      PHQ-2 Total Score: 1    Additional concerns today:  No    Do you feel safe in your environment? Yes    Have you ever done Advance Care Planning? (For example, a Health Directive, POLST, or a discussion with a medical provider or your loved ones about your wishes): Yes, patient states has an Advance Care Planning document and will bring a copy to the clinic.       Fall risk  Fallen 2 or more times in the past year?: No  Any fall with injury in the past year?: No    Cognitive Screening   1) Repeat 3 items (Leader, Season, Table)    2) Clock draw: NORMAL  3) 3 item recall: Recalls 3 objects  Results: 3 items recalled: COGNITIVE IMPAIRMENT LESS LIKELY    Mini-CogTM Copyright KAMRAN Ortega. Licensed by the author for use in [facility]; reprinted with permission (esther@.[email or web address]). All rights reserved.      Do you have sleep apnea, excessive snoring or daytime drowsiness?: yes    Reviewed and updated as needed this visit by clinical staff   Tobacco  " Allergies  Meds  Problems  Med Hx  Surg Hx  Fam Hx  Soc   Hx          Reviewed and updated as needed this visit by Provider   Tobacco  Allergies  Meds  Problems  Med Hx  Surg Hx  Fam Hx           Social History     Tobacco Use     Smoking status: Never Smoker     Smokeless tobacco: Never Used   Substance Use Topics     Alcohol use: Not Currently     Comment: socially- hardly      If you drink alcohol do you typically have >3 drinks per day or >7 drinks per week? No    Alcohol Use 7/27/2022   Prescreen: >3 drinks/day or >7 drinks/week? No   Prescreen: >3 drinks/day or >7 drinks/week? -   AUDIT SCORE  -               Current providers sharing in care for this patient include:   Patient Care Team:  Nery Mosley MD as PCP - General (Family Practice)  Nery Mosley MD as Assigned PCP    The following health maintenance items are reviewed in Epic and correct as of today:  Health Maintenance Due   Topic Date Due     DEPRESSION ACTION PLAN  Never done     ZOSTER IMMUNIZATION (1 of 2) Never done     COLORECTAL CANCER SCREENING  01/07/2021     COVID-19 Vaccine (4 - Booster for Moderna series) 03/22/2022     MAMMO SCREENING  04/28/2022               Review of Systems   Constitutional: Negative for chills and fever.   HENT: Negative for congestion, ear pain, hearing loss and sore throat.    Eyes: Negative for pain and visual disturbance.   Respiratory: Negative for cough and shortness of breath.    Cardiovascular: Negative for chest pain, palpitations and peripheral edema.   Gastrointestinal: Negative for abdominal pain, constipation, diarrhea, heartburn, hematochezia and nausea.   Breasts:  Negative for tenderness, breast mass and discharge.   Genitourinary: Negative for dysuria, frequency, genital sores, hematuria, pelvic pain, urgency, vaginal bleeding and vaginal discharge.   Musculoskeletal: Negative for arthralgias, joint swelling and myalgias.   Skin: Negative for rash.   Neurological: Negative for  "dizziness, weakness, headaches and paresthesias.   Psychiatric/Behavioral: Negative for mood changes. The patient is not nervous/anxious.    Answers for HPI/ROS submitted by the patient on 7/27/2022  If you checked off any problems, how difficult have these problems made it for you to do your work, take care of things at home, or get along with other people?: Not difficult at all  PHQ9 TOTAL SCORE: 6      Constitutional, HEENT, cardiovascular, pulmonary, GI, , musculoskeletal, neuro, skin, endocrine and psych systems are negative, except as otherwise noted.    OBJECTIVE:   /70   Pulse 57   Temp 97.7  F (36.5  C) (Temporal)   Resp 20   Ht 1.695 m (5' 6.73\")   Wt 96.4 kg (212 lb 8 oz)   SpO2 96%   BMI 33.55 kg/m   Estimated body mass index is 33.55 kg/m  as calculated from the following:    Height as of this encounter: 1.695 m (5' 6.73\").    Weight as of this encounter: 96.4 kg (212 lb 8 oz).  Physical Exam  GENERAL: healthy, alert and no distress  EYES: Eyes grossly normal to inspection, PERRL and conjunctivae and sclerae normal  HENT: ear canals and TM's normal, nose and mouth without ulcers or lesions  NECK: no adenopathy, no asymmetry, masses, or scars and thyroid normal to palpation  RESP: lungs clear to auscultation - no rales, rhonchi or wheezes  BREAST: declined  CV: regular rate and rhythm, normal S1 S2, no S3 or S4, no murmur, click or rub, no peripheral edema and peripheral pulses strong  ABDOMEN: soft, nontender, no hepatosplenomegaly, no masses and bowel sounds normal  MS: no gross musculoskeletal defects noted, no edema  SKIN: no suspicious lesions or rashes  NEURO: Normal strength and tone, mentation intact and speech normal  PSYCH: mentation appears normal, affect normal/bright        ASSESSMENT / PLAN:       ICD-10-CM    1. Encounter for Medicare annual wellness exam  Z00.00    2. Moderate major depression (H)  F32.1 citalopram (CELEXA) 40 MG tablet   3. Age-related osteoporosis without " "current pathological fracture  M81.0 alendronate (FOSAMAX) 70 MG tablet   4. Screening for colon cancer  Z12.11 Fecal colorectal cancer screen (FIT)     Mood is been well controlled now that she is increased her Celexa to 40 mg again.  She did not do well with her wean and would like to continue this medication as it stands    Patient has been advised of split billing requirements and indicates understanding: Yes    COUNSELING:  Reviewed preventive health counseling, as reflected in patient instructions       Regular exercise       Healthy diet/nutrition    Estimated body mass index is 33.55 kg/m  as calculated from the following:    Height as of this encounter: 1.695 m (5' 6.73\").    Weight as of this encounter: 96.4 kg (212 lb 8 oz).    Weight management plan: Discussed healthy diet and exercise guidelines    She reports that she has never smoked. She has never used smokeless tobacco.      Appropriate preventive services were discussed with this patient, including applicable screening as appropriate for cardiovascular disease, diabetes, osteopenia/osteoporosis, and glaucoma.  As appropriate for age/gender, discussed screening for colorectal cancer, prostate cancer, breast cancer, and cervical cancer. Checklist reviewing preventive services available has been given to the patient.    Reviewed patients plan of care and provided an AVS. The Basic Care Plan (routine screening as documented in Health Maintenance) for Cesia meets the Care Plan requirement. This Care Plan has been established and reviewed with the Patient.    Counseling Resources:  ATP IV Guidelines  Pooled Cohorts Equation Calculator  Breast Cancer Risk Calculator  Breast Cancer: Medication to Reduce Risk  FRAX Risk Assessment  ICSI Preventive Guidelines  Dietary Guidelines for Americans, 2010  [employer]'s MyPlate  ASA Prophylaxis  Lung CA Screening    Nery Mosley MD, MD  [facility]    Identified Health Risks:  "

## 2022-07-27 NOTE — PATIENT INSTRUCTIONS
Patient Education   Personalized Prevention Plan  You are due for the preventive services outlined below.  Your care team is available to assist you in scheduling these services.  If you have already completed any of these items, please share that information with your care team to update in your medical record.  Health Maintenance Due   Topic Date Due     Depression Action Plan  Never done     Zoster (Shingles) Vaccine (1 of 2) Never done     Colorectal Cancer Screening  01/07/2021     COVID-19 Vaccine (4 - Booster for Moderna series) 03/22/2022     Mammogram  04/28/2022     ANNUAL REVIEW OF HM ORDERS  05/05/2022       Exercise for a Healthier Heart  You may wonder how you can improve the health of your heart. If you re thinking about exercise, you re on the right track. You don t need to become an athlete. But you do need a certain amount of brisk exercise to help strengthen your heart. If you have been diagnosed with a heart condition, your healthcare provider may advise exercise to help stabilize your condition. To help make exercise a habit, choose safe, fun activities.      Exercise with a friend. When activity is fun, you're more likely to stick with it.   Before you start  Check with your healthcare provider before starting an exercise program. This is especially important if you have not been active for a while. It's also important if you have a long-term (chronic) health problem such as heart disease, diabetes, or obesity. Or if you are at high risk for having these problems.   Why exercise?  Exercising regularly offers many healthy rewards. It can help you do all of the following:     Improve your blood cholesterol level to help prevent further heart trouble    Lower your blood pressure to help prevent a stroke or heart attack    Control diabetes, or reduce your risk of getting this disease    Improve your heart and lung function    Reach and stay at a healthy weight    Make your muscles stronger so you  can stay active    Prevent falls and fractures by slowing the loss of bone mass (osteoporosis)    Manage stress better    Reduce your blood pressure    Improve your sense of self and your body image  Exercise tips      Ease into your routine. Set small goals. Then build on them. If you are not sure what your activity level should be, talk with your healthcare provider first before starting an exercise routine.    Exercise on most days. Aim for a total of 150 minutes (2 hours and 30 minutes) or more of moderate-intensity aerobic activity each week. Or 75 minutes (1 hour and 15 minutes) or more of vigorous-intensity aerobic activity each week. Or try for a combination of both. Moderate activity means that you breathe heavier and your heart rate increases but you can still talk. Think about doing 40 minutes of moderate exercise, 3 to 4 times a week. For best results, activity should last for about 40 minutes to lower blood pressure and cholesterol. It's OK to work up to the 40-minute period over time. Examples of moderate-intensity activity are walking 1 mile in 15 minutes. Or doing 30 to 45 minutes of yard work.    Step up your daily activity level.  Along with your exercise program, try being more active the whole day. Walk instead of drive. Or park further away so that you take more steps each day. Do more household tasks or yard work. You may not be able to meet the advised mount of physical activity. But doing some moderate- or vigorous-intensity aerobic activity can help reduce your risk for heart disease. Your healthcare provider can help you figure out what is best for you.    Choose 1 or more activities you enjoy.  Walking is one of the easiest things you can do. You can also try swimming, riding a bike, dancing, or taking an exercise class.    When to call your healthcare provider  Call your healthcare provider if you have any of these:     Chest pain or feel dizzy or lightheaded    Burning, tightness,  pressure, or heaviness in your chest, neck, shoulders, back, or arms    Abnormal shortness of breath    More joint or muscle pain    A very fast or irregular heartbeat (palpitations)  [employer] last reviewed this educational content on 7/1/2019 2000-2021 [employer]. All rights reserved. This information is not intended as a substitute for professional medical care. Always follow your healthcare professional's instructions.          Understanding USDA MyPlate  The USDA has guidelines to help you make healthy food choices. These are called MyPlate. MyPlate shows the food groups that make up healthy meals using the image of a place setting. Before you eat, think about the healthiest choices for what to put on your plate or in your cup or bowl. To learn more about building a healthy plate, visit www.choosemyplate.gov.    The food groups    Fruits. Any fruit or 100% fruit juice counts as part of the Fruit Group. Fruits may be fresh, canned, frozen, or dried, and may be whole, cut-up, or pureed. Make 1/2 of your plate fruits and vegetables.    Vegetables. Any vegetable or 100% vegetable juice counts as a member of the Vegetable Group. Vegetables may be fresh, frozen, canned, or dried. They can be served raw or cooked and may be whole, cut-up, or mashed. Make 1/2 of your plate fruits and vegetables.    Grains. All foods made from grains are part of the Grains Group. These include wheat, rice, oats, cornmeal, and barley. Grains are often used to make foods such as bread, pasta, oatmeal, cereal, tortillas, and grits. Grains should be no more than 1/4 of your plate. At least half of your grains should be whole grains.    Protein. This group includes meat, poultry, seafood, beans and peas, eggs, processed soy products (such as tofu), nuts (including nut butters), and seeds. Make protein choices no more than 1/4 of your plate. Meat and poultry choices should be lean or low fat.    Dairy. The Dairy Group  includes all fluid milk products and foods made from milk that contain calcium, such as yogurt and cheese. (Foods that have little calcium, such as cream, butter, and cream cheese, are not part of this group.) Most dairy choices should be low-fat or fat-free.    Oils. Oils aren't a food group, but they do contain essential nutrients. However it's important to watch your intake of oils. These are fats that are liquid at room temperature. They include canola, corn, olive, soybean, vegetable, and sunflower oil. Foods that are mainly oil include mayonnaise, certain salad dressings, and soft margarines. You likely already get your daily oil allowance from the foods you eat.  Things to limit  Eating healthy also means limiting these things in your diet:       Salt (sodium). Many processed foods have a lot of sodium. To keep sodium intake down, eat fresh vegetables, meats, poultry, and seafood when possible. Purchase low-sodium, reduced-sodium, or no-salt-added food products at the store. And don't add salt to your meals at home. Instead, season them with herbs and spices such as dill, oregano, cumin, and paprika. Or try adding flavor with lemon or lime zest and juice.    Saturated fat. Saturated fats are most often found in animal products such as beef, pork, and chicken. They are often solid at room temperature, such as butter. To reduce your saturated fat intake, choose leaner cuts of meat and poultry. And try healthier cooking methods such as grilling, broiling, roasting, or baking. For a simple lower-fat swap, use plain nonfat yogurt instead of mayonnaise when making potato salad or macaroni salad.    Added sugars. These are sugars added to foods. They are in foods such as ice cream, candy, soda, fruit drinks, sports drinks, energy drinks, cookies, pastries, jams, and syrups. Cut down on added sugars by sharing sweet treats with a family member or friend. You can also choose fruit for dessert, and drink water or other  "unsweetened beverages.     [employer] last reviewed this educational content on 6/1/2020 2000-2021 [employer]. All rights reserved. This information is not intended as a substitute for professional medical care. Always follow your healthcare professional's instructions.          Depression and Suicide in Older Adults    Nearly 2 million older Americans have some type of depression. Some of them even take their own lives. Yet depression among older adults is often ignored. Learn the warning signs. You may help spare a loved one needless pain. You may also save a life.   What is depression?  Depression is a common and serious illness that affects the way you think and feel. It is not a normal part of aging, nor is it a sign of weakness, a character flaw, or something you can snap out of. Most people with depression need treatment to get better. The most common symptom is a feeling of deep sadness. People who are depressed also may seem tired and listless. And nothing seems to give them pleasure. It s normal to grieve or be sad sometimes. But sadness lessens or passes with time. Depression rarely goes away or improves on its own. A person with clinical depression can't \"snap out of it.\" Other symptoms of depression are:     Sleeping more or less than normal    Eating more or less than normal    Having headaches, stomachaches, or other pains that don t go away    Feeling nervous,  empty,  or worthless    Crying a great deal    Thinking or talking about suicide or death    Loss of interest in activities previously enjoyed    Social isolation    Feeling confused or forgetful  What causes it?  The causes of depression aren t fully known. But it is thought to result from a complex blend of these factors:     Biochemistry. Certain chemicals in the brain play a role.    Genes. Depression does run in families.    Life stress. Life stresses can also trigger depression in some people. Older adults often face many " stressors, such as death of friends or a spouse, health problems, and financial concerns.    Chronic conditions. This includes conditions such as diabetes, heart disease, or cancer. These can cause symptoms of depression. Medicine side effects can cause changes in thoughts and behaviors.  How you can help  Often, depressed people may not want to ask for help. When they do, they may be ignored. Or, they may receive the wrong treatment. You can help by showing parents and older friends love and support. If they seem depressed, don t lecture the person, ignore the symptoms, or discount the symptoms as a  normal  part of aging -which they are not. Get involved, listen, and show interest and support.   Help them understand that depression is a treatable illness. Tell them you can help them find the right treatment. Offer to go to their healthcare provider's appointment with them for support when the symptoms are discussed. With their approval, contact a local mental health center, social service agency, or hospital about services.   You can be an advocate for him or her at healthcare appointments. Many older adults have chronic illnesses that can cause symptoms of depression. Medicine side effects can change thoughts and behaviors. You can help make sure that the healthcare provider looks at all of these factors. He or she should refer your family member or friend to a mental healthcare provider when needed. in some cases, untreated depression can lead to a misdiagnosis. A person may be diagnosed with a brain disorder such as dementia. If the healthcare provider does not take the issue of depression seriously, help your family member or friend to find another provider.   Don't be afraid to ask  If you think an older person you care about could be suicidal, ask,  Have you thought about suicide?  Most people will tell you the truth. If they say  yes,  they may already have a plan for how and when they will attempt it. Find  out as much as you can. The more detailed the plan, and the easier it is to carry out, the more danger the person is in right now. Tell the person you are there for them and do not want them to harm him or herself. Don't wait to get help for the person. Call the person's healthcare provider, local hospital, or emergency services.   To learn more    National Suicide Prevention Lifeline (crisis hotline) 446-489-BTQF (735-153-7947)    National [place] of Mental Axlbnl327-334-7082jkr.[facility].nih.gov    National [place] on Mental Dpdoyvb407-994-2865fnm.krystian.org    Mental Health Zjwyxge104-867-1158lap.[facility].org    National Suicide Aswnjlu630-JTCNCOQ (978-350-9720)    Call 911  Never leave the person alone. A person who is actively suicidal needs psychiatric care right away. They will need constant supervision. Never leave the person out of sight. Call 911 or the national 24-hour suicide crisis hotline at 880-270-JVDG (664-873-8165). You can also take the person to the closest emergency room.   [employer] last reviewed this educational content on 5/1/2020 2000-2021 [employer]. All rights reserved. This information is not intended as a substitute for professional medical care. Always follow your healthcare professional's instructions.

## 2022-08-01 PROCEDURE — 82274 ASSAY TEST FOR BLOOD FECAL: CPT | Performed by: FAMILY MEDICINE

## 2022-08-04 LAB — HEMOCCULT STL QL IA: NEGATIVE

## 2022-08-07 ENCOUNTER — HEALTH MAINTENANCE LETTER (OUTPATIENT)
Age: 77
End: 2022-08-07

## 2022-10-23 ENCOUNTER — HEALTH MAINTENANCE LETTER (OUTPATIENT)
Age: 77
End: 2022-10-23

## 2022-11-07 ENCOUNTER — NURSE TRIAGE (OUTPATIENT)
Dept: FAMILY MEDICINE | Facility: OTHER | Age: 77
End: 2022-11-07

## 2022-11-07 NOTE — TELEPHONE ENCOUNTER
Patient calling in regards to cold symptoms. This is the start of week 3 of illness.     Has tried Vicks, Mucinex. Seem to help some. Also tried her brothers inhaler but did not notice anything.     Hacking cough-yellow mucous, nasal voice, congestion. Mouth breathing.     No trouble breathing, no fevers, no headaches, no sinus pain, no sore throat, no ear pain. No vomiting, diarrhea, or constipation. No urinary symptoms. Staying hydrated.    SEE IN OFFICE TODAY OR TOMORROW:   * You need to be examined. Let me give you an appointment.  * IF NO AVAILABLE OFFICE APPOINTMENTS: You need to be seen in an Urgent Care Center. Go to the one at nearest location. Go there today. A nearby Urgent Care Center is often a good source of care.    Advised patient be seen in clinic for ongoing URI/cough symptoms. Patient agrees and was scheduled for tomorrow with BAILEE Griffiths, RN  [facility]  November 7, 2022        Reason for Disposition    Nasal discharge present > 10 days    Cough has been present for > 3 weeks    Additional Information    Negative: SEVERE difficulty breathing (e.g., struggling for each breath, speaks in single words)    Negative: Very weak (can't stand)    Negative: Sounds like a life-threatening emergency to the triager    Negative: Difficulty breathing and not from stuffy nose (e.g., not relieved by cleaning out the nose)    Negative: Runny nose is caused by pollen or other allergies    Negative: Cough is main symptom    Negative: Sore throat is main symptom    Negative: Patient sounds very sick or weak to the triager    Negative: Fever > 103 F (39.4 C)    Negative: Fever > 101 F (38.3 C) and over 60 years of age    Negative: Fever > 100.0 F (37.8 C) and has diabetes mellitus or a weak immune system (e.g., HIV positive, cancer chemotherapy, organ transplant, splenectomy, chronic steroids)    Negative: Fever > 100.0 F (37.8 C) and bedridden (e.g., nursing home patient,  stroke, chronic illness, recovering from surgery)    Negative: Fever present > 3 days (72 hours)    Negative: Fever returns after gone for over 24 hours and symptoms worse or not improved    Negative: Sinus pain (not just congestion) and fever    Negative: Earache    Negative: Sinus congestion (pressure, fullness) present > 10 days    Negative: Bluish (or gray) lips or face    Negative: SEVERE difficulty breathing (e.g., struggling for each breath, speaks in single words)    Negative: Rapid onset of cough and has hives    Negative: Coughing started suddenly after medicine, an allergic food or bee sting    Negative: Difficulty breathing after exposure to flames, smoke, or fumes    Negative: Sounds like a life-threatening emergency to the triager    Negative: Previous asthma attacks and this feels like asthma attack    Negative: Dry cough (non-productive; no sputum or minimal clear sputum) and within 14 days of COVID-19 Exposure    Negative: MODERATE difficulty breathing (e.g., speaks in phrases, SOB even at rest, pulse 100-120) and still present when not coughing    Negative: Chest pain present when not coughing    Negative: Passed out (i.e., fainted, collapsed and was not responding)    Negative: Patient sounds very sick or weak to the triager    Negative: MILD difficulty breathing (e.g., minimal/no SOB at rest, SOB with walking, pulse <100) and still present when not coughing    Negative: Coughed up > 1 tablespoon (15 ml) blood (Exception: Blood-tinged sputum.)    Negative: Fever > 103 F (39.4 C)    Negative: Fever > 101 F (38.3 C) and over 60 years of age    Negative: Fever > 100.0 F (37.8 C) and has diabetes mellitus or a weak immune system (e.g., HIV positive, cancer chemotherapy, organ transplant, splenectomy, chronic steroids)    Negative: Fever > 100.0 F (37.8 C) and bedridden (e.g., nursing home patient, stroke, chronic illness, recovering from surgery)    Negative: Increasing ankle swelling    Negative:  Wheezing is present    Negative: SEVERE coughing spells (e.g., whooping sound after coughing, vomiting after coughing)    Negative: Coughing up justyna-colored (reddish-brown) or blood-tinged sputum    Negative: Fever present > 3 days (72 hours)    Negative: Fever returns after gone for over 24 hours and symptoms worse or not improved    Negative: Using nasal washes and pain medicine > 24 hours and sinus pain persists    Negative: Known COPD or other severe lung disease (i.e., bronchiectasis, cystic fibrosis, lung surgery) and worsening symptoms (i.e., increased sputum purulence or amount, increased breathing difficulty)    Negative: Continuous (nonstop) coughing interferes with work or school and no improvement using cough treatment per Care Advice    Negative: Patient wants to be seen    Protocols used: COMMON COLD-A-OH, COUGH-A-OH

## 2022-11-08 ENCOUNTER — OFFICE VISIT (OUTPATIENT)
Dept: FAMILY MEDICINE | Facility: OTHER | Age: 77
End: 2022-11-08
Payer: COMMERCIAL

## 2022-11-08 VITALS
WEIGHT: 212 LBS | HEART RATE: 68 BPM | SYSTOLIC BLOOD PRESSURE: 138 MMHG | BODY MASS INDEX: 33.27 KG/M2 | DIASTOLIC BLOOD PRESSURE: 68 MMHG | OXYGEN SATURATION: 97 % | TEMPERATURE: 98.6 F | HEIGHT: 67 IN

## 2022-11-08 DIAGNOSIS — R05.1 ACUTE COUGH: Primary | ICD-10-CM

## 2022-11-08 DIAGNOSIS — R06.2 WHEEZE: ICD-10-CM

## 2022-11-08 DIAGNOSIS — R09.81 NASAL CONGESTION: ICD-10-CM

## 2022-11-08 PROCEDURE — 99213 OFFICE O/P EST LOW 20 MIN: CPT | Mod: CS | Performed by: PHYSICIAN ASSISTANT

## 2022-11-08 PROCEDURE — U0003 INFECTIOUS AGENT DETECTION BY NUCLEIC ACID (DNA OR RNA); SEVERE ACUTE RESPIRATORY SYNDROME CORONAVIRUS 2 (SARS-COV-2) (CORONAVIRUS DISEASE [COVID-19]), AMPLIFIED PROBE TECHNIQUE, MAKING USE OF HIGH THROUGHPUT TECHNOLOGIES AS DESCRIBED BY CMS-2020-01-R: HCPCS | Performed by: PHYSICIAN ASSISTANT

## 2022-11-08 PROCEDURE — U0005 INFEC AGEN DETEC AMPLI PROBE: HCPCS | Performed by: PHYSICIAN ASSISTANT

## 2022-11-08 RX ORDER — AMOXICILLIN 500 MG/1
500 CAPSULE ORAL 2 TIMES DAILY
Qty: 14 CAPSULE | Refills: 0 | Status: SHIPPED | OUTPATIENT
Start: 2022-11-08 | End: 2022-11-15

## 2022-11-08 RX ORDER — FLUTICASONE PROPIONATE 50 MCG
1 SPRAY, SUSPENSION (ML) NASAL DAILY
Qty: 15.8 ML | Refills: 0 | Status: SHIPPED | OUTPATIENT
Start: 2022-11-08 | End: 2023-09-11

## 2022-11-08 RX ORDER — GUAIFENESIN 600 MG/1
600-1200 TABLET, EXTENDED RELEASE ORAL 2 TIMES DAILY PRN
Qty: 40 TABLET | Refills: 0 | Status: SHIPPED | OUTPATIENT
Start: 2022-11-08 | End: 2023-09-11

## 2022-11-08 ASSESSMENT — PAIN SCALES - GENERAL: PAINLEVEL: NO PAIN (0)

## 2022-11-08 ASSESSMENT — PATIENT HEALTH QUESTIONNAIRE - PHQ9
10. IF YOU CHECKED OFF ANY PROBLEMS, HOW DIFFICULT HAVE THESE PROBLEMS MADE IT FOR YOU TO DO YOUR WORK, TAKE CARE OF THINGS AT HOME, OR GET ALONG WITH OTHER PEOPLE: NOT DIFFICULT AT ALL
SUM OF ALL RESPONSES TO PHQ QUESTIONS 1-9: 4
SUM OF ALL RESPONSES TO PHQ QUESTIONS 1-9: 4

## 2022-11-08 NOTE — PROGRESS NOTES
Assessment & Plan     Acute cough  Patient has completed negative covid test at home. She would like to complete another today as she has an aunt and cousin who are immune suppressed. I will start her on amoxicillin to cover for sinusitis. Reviewed the possible adverse effects of the medication. She will also have guaifenesin to help with cough as needed. Home cares reviewed and educational information attached to the AVS.   - Symptomatic; Unknown COVID-19 Virus (Coronavirus) by PCR Nose  - amoxicillin (AMOXIL) 500 MG capsule; Take 1 capsule (500 mg) by mouth 2 times daily for 7 days  - guaiFENesin (MUCINEX) 600 MG 12 hr tablet; Take 1-2 tablets (600-1,200 mg) by mouth 2 times daily as needed for congestion or cough    Nasal congestion  See above.   - Symptomatic; Unknown COVID-19 Virus (Coronavirus) by PCR Nose  - amoxicillin (AMOXIL) 500 MG capsule; Take 1 capsule (500 mg) by mouth 2 times daily for 7 days  - fluticasone (FLONASE) 50 MCG/ACT nasal spray; Spray 1 spray into both nostrils daily    Wheeze  See above.   - Symptomatic; Unknown COVID-19 Virus (Coronavirus) by PCR Nose  - amoxicillin (AMOXIL) 500 MG capsule; Take 1 capsule (500 mg) by mouth 2 times daily for 7 days  - guaiFENesin (MUCINEX) 600 MG 12 hr tablet; Take 1-2 tablets (600-1,200 mg) by mouth 2 times daily as needed for congestion or cough    GERALDINE [facility] LANA Callaway is a 77 year old, presenting for the following health issues:  URI      URI    History of Present Illness       Reason for visit:  Flue? make sure I'm not contagious  Symptom onset:  3-4 weeks ago  Symptoms include:  Cough,  stuffy sounding voice, breath heavy  Symptom intensity:  Mild  Symptom progression:  Staying the same  Had these symptoms before:  Yes  Has tried/received treatment for these symptoms:  Yes  Previous treatment was successful:  Yes  Prior treatment description:  A shot (2020 while in [facility])    She eats  "0-1 servings of fruits and vegetables daily.She consumes 0 sweetened beverage(s) daily.She exercises with enough effort to increase her heart rate 9 or less minutes per day.  She exercises with enough effort to increase her heart rate 3 or less days per week.   She is taking medications regularly.    Today's PHQ-9         PHQ-9 Total Score: 4    PHQ-9 Q9 Thoughts of better off dead/self-harm past 2 weeks :   Not at all    How difficult have these problems made it for you to do your work, take care of things at home, or get along with other people: Not difficult at all     Review of Systems   Constitutional, HEENT, cardiovascular, pulmonary, gi and gu systems are negative, except as otherwise noted.      Objective    /68   Pulse 68   Temp 98.6  F (37  C) (Temporal)   Ht 1.702 m (5' 7\")   Wt 96.2 kg (212 lb)   SpO2 97%   BMI 33.20 kg/m    Body mass index is 33.2 kg/m .  Physical Exam   GENERAL: healthy, alert and no distress  EYES: Eyes grossly normal to inspection, PERRL and conjunctivae and sclerae normal  HENT: normal cephalic/atraumatic, ear canals and TM's normal, nasal mucosa edematous , rhinorrhea clear, oropharynx mildly erythematous, oral mucous membranes moist and tenderness to percussion over the maxillary sinuses:   NECK: no asymmetry, masses, or scars, thyroid normal to palpation and tender right anterior lymphs  RESP: lungs clear to auscultation - no rales, rhonchi or wheezes  CV: regular rate and rhythm, normal S1 S2, no S3 or S4, no murmur, click or rub, no peripheral edema and peripheral pulses strong  PSYCH: mentation appears normal, affect normal/bright    Results for orders placed or performed in visit on 11/08/22   Symptomatic; Unknown COVID-19 Virus (Coronavirus) by PCR Nose     Status: Abnormal    Specimen: Nose; Swab   Result Value Ref Range    SARS CoV2 PCR Positive (A) Negative    Narrative    Testing was performed using the Xpert Xpress SARS-CoV-2 Assay on the  Cepheid Gene-Xpert " Instrument Systems. Additional information about  this Emergency Use Authorization (EUA) assay can be found via the Lab  Guide. This test should be ordered for the detection of SARS-CoV-2 in  individuals who meet SARS-CoV-2 clinical and/or epidemiological  criteria. Test performance is unknown in asymptomatic patients. This  test is for in vitro diagnostic use under the FDA EUA for  laboratories certified under CLIA to perform high complexity testing.  This test has not been FDA cleared or approved. A negative result  does not rule out the presence of PCR inhibitors in the specimen or  target RNA in concentration below the limit of detection for the  assay. The possibility of a false negative should be considered if  the patient's recent exposure or clinical presentation suggests  COVID-19. This test was validated by the [facility]. This laboratory is certified under  the Clinical Laboratory Improvement Amendments of 1988 (CLIA-88) as  qualified to perform high complexity laboratory testing.

## 2022-11-09 LAB — SARS-COV-2 RNA RESP QL NAA+PROBE: POSITIVE

## 2022-11-16 ENCOUNTER — TELEPHONE (OUTPATIENT)
Dept: FAMILY MEDICINE | Facility: OTHER | Age: 77
End: 2022-11-16

## 2022-11-16 DIAGNOSIS — R05.1 ACUTE COUGH: ICD-10-CM

## 2022-11-16 DIAGNOSIS — R06.2 WHEEZE: ICD-10-CM

## 2022-11-16 DIAGNOSIS — R09.81 NASAL CONGESTION: ICD-10-CM

## 2022-11-16 RX ORDER — AMOXICILLIN 500 MG/1
500 CAPSULE ORAL 2 TIMES DAILY
Qty: 14 CAPSULE | Refills: 0 | Status: CANCELLED | OUTPATIENT
Start: 2022-11-16

## 2022-11-16 NOTE — TELEPHONE ENCOUNTER
Symptoms still some congestion in upper chest, slight cough.  Bringing some clear stuff out. She says she feels much better but the congestion isn't completely gone.   Just finished amoxicilin Tuesday and feels like she needs another round of antibiotics.      No fevers or body aches, chills or shortness of breath.   Will continue to take the Mucinex and drink plenty of fluids.      I told her to monitor her symptoms and if she starts to run a fever, body aches, chills, congestion gets worse, has shortness of breath or chest pain to call our triage line.    Are you ok with this or did you want her to have another round of Amoxicillin?    Bianca Winslow RN  [facility] ~ Registered Nurse  Clinic Triage ~ [place] [facility] [place]  November 16, 2022

## 2022-11-16 NOTE — TELEPHONE ENCOUNTER
Patient has seen provider  on 11/8 and was prescribed amoxicillin.  She is done with prescriptions but is still having symptoms.  Patient is requesting a refill.

## 2022-11-17 NOTE — TELEPHONE ENCOUNTER
I do not recommend additional antibiotics at this time. Her covid test returned positive on Nov 8th. I suspect that her symptoms are lingering from the recent infection. If she feels that they do not continue to improve with time, use of lfonase, daily loratadine and plenty of fluids then a re-evaluation is recommended.    Santo Dubose PA-C on 11/17/2022 at 8:13 AM

## 2023-04-23 DIAGNOSIS — F32.1 MODERATE MAJOR DEPRESSION (H): ICD-10-CM

## 2023-04-25 RX ORDER — CITALOPRAM HYDROBROMIDE 40 MG/1
TABLET ORAL
Qty: 90 TABLET | Refills: 1 | Status: SHIPPED | OUTPATIENT
Start: 2023-04-25 | End: 2023-09-11

## 2023-04-25 NOTE — TELEPHONE ENCOUNTER
"Pending Prescriptions:                       Disp   Refills    citalopram (CELEXA) 40 MG tablet [Pharmacy*90 tab*1        Sig: TAKE 1 TABLET BY MOUTH EVERY DAY    Routing refill request to provider for review/approval because:  A break in medication    Requested Prescriptions   Pending Prescriptions Disp Refills    citalopram (CELEXA) 40 MG tablet [Pharmacy Med Name: CITALOPRAM HBR 40 MG TABLET] 90 tablet 1     Sig: TAKE 1 TABLET BY MOUTH EVERY DAY       SSRIs Protocol Passed - 4/23/2023 10:26 AM        Passed - PHQ-9 score less than 5 in past 6 months     Please review last PHQ-9 score.           Passed - Medication is active on med list        Passed - Patient is age 18 or older        Passed - No active pregnancy on record        Passed - No positive pregnancy test in last 12 months        Passed - Recent (6 mo) or future (30 days) visit within the authorizing provider's specialty     Patient had office visit in the last 6 months or has a visit in the next 30 days with authorizing provider or within the authorizing provider's specialty.  See \"Patient Info\" tab in inbasket, or \"Choose Columns\" in Meds & Orders section of the refill encounter.                 "

## 2023-06-27 ENCOUNTER — PATIENT OUTREACH (OUTPATIENT)
Dept: CARE COORDINATION | Facility: CLINIC | Age: 78
End: 2023-06-27
Payer: COMMERCIAL

## 2023-07-11 ENCOUNTER — PATIENT OUTREACH (OUTPATIENT)
Dept: CARE COORDINATION | Facility: CLINIC | Age: 78
End: 2023-07-11
Payer: COMMERCIAL

## 2023-08-27 ENCOUNTER — HEALTH MAINTENANCE LETTER (OUTPATIENT)
Age: 78
End: 2023-08-27

## 2023-09-11 ENCOUNTER — OFFICE VISIT (OUTPATIENT)
Dept: FAMILY MEDICINE | Facility: OTHER | Age: 78
End: 2023-09-11
Payer: COMMERCIAL

## 2023-09-11 VITALS
OXYGEN SATURATION: 97 % | SYSTOLIC BLOOD PRESSURE: 110 MMHG | BODY MASS INDEX: 34.53 KG/M2 | HEART RATE: 60 BPM | RESPIRATION RATE: 15 BRPM | TEMPERATURE: 97.1 F | HEIGHT: 67 IN | DIASTOLIC BLOOD PRESSURE: 68 MMHG | WEIGHT: 220 LBS

## 2023-09-11 DIAGNOSIS — F32.1 MODERATE MAJOR DEPRESSION (H): ICD-10-CM

## 2023-09-11 DIAGNOSIS — Z00.00 ENCOUNTER FOR MEDICARE ANNUAL WELLNESS EXAM: Primary | ICD-10-CM

## 2023-09-11 DIAGNOSIS — Z12.31 VISIT FOR SCREENING MAMMOGRAM: ICD-10-CM

## 2023-09-11 DIAGNOSIS — E55.9 VITAMIN D DEFICIENCY: ICD-10-CM

## 2023-09-11 DIAGNOSIS — Z12.11 SCREEN FOR COLON CANCER: ICD-10-CM

## 2023-09-11 DIAGNOSIS — Z13.220 SCREENING FOR HYPERLIPIDEMIA: ICD-10-CM

## 2023-09-11 DIAGNOSIS — F41.9 ANXIETY: ICD-10-CM

## 2023-09-11 DIAGNOSIS — R73.09 ELEVATED GLUCOSE: ICD-10-CM

## 2023-09-11 DIAGNOSIS — M81.0 AGE-RELATED OSTEOPOROSIS WITHOUT CURRENT PATHOLOGICAL FRACTURE: ICD-10-CM

## 2023-09-11 LAB
ALBUMIN SERPL BCG-MCNC: 4.5 G/DL (ref 3.5–5.2)
ALP SERPL-CCNC: 73 U/L (ref 35–104)
ALT SERPL W P-5'-P-CCNC: 19 U/L (ref 0–50)
ANION GAP SERPL CALCULATED.3IONS-SCNC: 12 MMOL/L (ref 7–15)
AST SERPL W P-5'-P-CCNC: 24 U/L (ref 0–45)
BILIRUB SERPL-MCNC: 0.4 MG/DL
BUN SERPL-MCNC: 10.8 MG/DL (ref 8–23)
CALCIUM SERPL-MCNC: 10.2 MG/DL (ref 8.8–10.2)
CHLORIDE SERPL-SCNC: 104 MMOL/L (ref 98–107)
CHOLEST SERPL-MCNC: 173 MG/DL
CREAT SERPL-MCNC: 0.77 MG/DL (ref 0.51–0.95)
DEPRECATED HCO3 PLAS-SCNC: 26 MMOL/L (ref 22–29)
EGFRCR SERPLBLD CKD-EPI 2021: 80 ML/MIN/1.73M2
GLUCOSE SERPL-MCNC: 99 MG/DL (ref 70–99)
HBA1C MFR BLD: 5.5 % (ref 0–5.6)
HDLC SERPL-MCNC: 53 MG/DL
LDLC SERPL CALC-MCNC: 87 MG/DL
NONHDLC SERPL-MCNC: 120 MG/DL
POTASSIUM SERPL-SCNC: 4.5 MMOL/L (ref 3.4–5.3)
PROT SERPL-MCNC: 6.8 G/DL (ref 6.4–8.3)
SODIUM SERPL-SCNC: 142 MMOL/L (ref 136–145)
TRIGL SERPL-MCNC: 166 MG/DL

## 2023-09-11 PROCEDURE — 82306 VITAMIN D 25 HYDROXY: CPT | Performed by: PHYSICIAN ASSISTANT

## 2023-09-11 PROCEDURE — 80061 LIPID PANEL: CPT | Performed by: PHYSICIAN ASSISTANT

## 2023-09-11 PROCEDURE — 80053 COMPREHEN METABOLIC PANEL: CPT | Performed by: PHYSICIAN ASSISTANT

## 2023-09-11 PROCEDURE — G0439 PPPS, SUBSEQ VISIT: HCPCS | Performed by: PHYSICIAN ASSISTANT

## 2023-09-11 PROCEDURE — 83036 HEMOGLOBIN GLYCOSYLATED A1C: CPT | Performed by: PHYSICIAN ASSISTANT

## 2023-09-11 PROCEDURE — 36415 COLL VENOUS BLD VENIPUNCTURE: CPT | Performed by: PHYSICIAN ASSISTANT

## 2023-09-11 PROCEDURE — 99214 OFFICE O/P EST MOD 30 MIN: CPT | Mod: 25 | Performed by: PHYSICIAN ASSISTANT

## 2023-09-11 RX ORDER — ALENDRONATE SODIUM 70 MG/1
TABLET ORAL
Qty: 12 TABLET | Refills: 3 | Status: SHIPPED | OUTPATIENT
Start: 2023-09-11 | End: 2024-04-15

## 2023-09-11 RX ORDER — BUSPIRONE HYDROCHLORIDE 5 MG/1
5 TABLET ORAL 2 TIMES DAILY
Qty: 60 TABLET | Refills: 1 | Status: SHIPPED | OUTPATIENT
Start: 2023-09-11 | End: 2023-10-05

## 2023-09-11 RX ORDER — CITALOPRAM HYDROBROMIDE 40 MG/1
40 TABLET ORAL DAILY
Qty: 90 TABLET | Refills: 1 | Status: SHIPPED | OUTPATIENT
Start: 2023-09-11 | End: 2024-06-11

## 2023-09-11 ASSESSMENT — ENCOUNTER SYMPTOMS
PARESTHESIAS: 0
HEMATOCHEZIA: 0
CONSTIPATION: 0
BREAST MASS: 0
MYALGIAS: 0
WEAKNESS: 0
HEADACHES: 0
ARTHRALGIAS: 0
JOINT SWELLING: 0
FEVER: 0
CHILLS: 0
SHORTNESS OF BREATH: 1
EYE PAIN: 0
NERVOUS/ANXIOUS: 0
DYSURIA: 0
HEMATURIA: 0
HEARTBURN: 0
COUGH: 1
PALPITATIONS: 0
DIARRHEA: 0
DIZZINESS: 0
FREQUENCY: 0
ABDOMINAL PAIN: 0
NAUSEA: 0
SORE THROAT: 0

## 2023-09-11 ASSESSMENT — ACTIVITIES OF DAILY LIVING (ADL): CURRENT_FUNCTION: NO ASSISTANCE NEEDED

## 2023-09-11 ASSESSMENT — PATIENT HEALTH QUESTIONNAIRE - PHQ9
SUM OF ALL RESPONSES TO PHQ QUESTIONS 1-9: 7
10. IF YOU CHECKED OFF ANY PROBLEMS, HOW DIFFICULT HAVE THESE PROBLEMS MADE IT FOR YOU TO DO YOUR WORK, TAKE CARE OF THINGS AT HOME, OR GET ALONG WITH OTHER PEOPLE: SOMEWHAT DIFFICULT
SUM OF ALL RESPONSES TO PHQ QUESTIONS 1-9: 7

## 2023-09-11 ASSESSMENT — PAIN SCALES - GENERAL: PAINLEVEL: NO PAIN (0)

## 2023-09-11 NOTE — PATIENT INSTRUCTIONS
"Patient Education   Personalized Prevention Plan  You are due for the preventive services outlined below.  Your care team is available to assist you in scheduling these services.  If you have already completed any of these items, please share that information with your care team to update in your medical record.  Health Maintenance Due   Topic Date Due     Depression Action Plan  Never done     Zoster (Shingles) Vaccine (1 of 2) Never done     COVID-19 Vaccine (4 - Moderna series) 01/17/2022     Mammogram  04/28/2022     Colorectal Cancer Screening  08/02/2022     Osteoporosis Screening  04/28/2023     Annual Wellness Visit  07/27/2023     ANNUAL REVIEW OF HM ORDERS  07/27/2023     Flu Vaccine (1) 09/01/2023     Learning About Being Physically Active  What is physical activity?     Being physically active means doing any kind of activity that gets your body moving.  The types of physical activity that can help you get fit and stay healthy include:  Aerobic or \"cardio\" activities. These make your heart beat faster and make you breathe harder, such as brisk walking, riding a bike, or running. They strengthen your heart and lungs and build up your endurance.  Strength training activities. These make your muscles work against, or \"resist,\" something. Examples include lifting weights or doing push-ups. These activities help tone and strengthen your muscles and bones.  Stretches. These let you move your joints and muscles through their full range of motion. Stretching helps you be more flexible.  Reaching a balance between these three types of physical activity is important because each one contributes to your overall fitness.  What are the benefits of being active?  Being active is one of the best things you can do for your health. It helps you to:  Feel stronger and have more energy to do all the things you like to do.  Focus better at school or work.  Feel, think, and sleep better.  Reach and stay at a healthy " "weight.  Lose fat and build lean muscle.  Lower your risk for serious health problems, including diabetes, heart attack, high blood pressure, and some cancers.  Keep your heart, lungs, bones, muscles, and joints strong and healthy.  How can you make being active part of your life?  Start slowly. Make it your long-term goal to get at least 30 minutes of exercise on most days of the week. Walking is a good choice. You also may want to do other activities, such as running, swimming, cycling, or playing tennis or team sports.  Pick activities that you like--ones that make your heart beat faster, your muscles stronger, and your muscles and joints more flexible. If you find more than one thing you like doing, do them all. You don't have to do the same thing every day.  Get your heart pumping every day. Any activity that makes your heart beat faster and keeps it at that rate for a while counts.  Here are some great ways to get your heart beating faster:  Go for a brisk walk, run, or bike ride.  Go for a hike or swim.  Go in-line skating.  Play a game of touch football, basketball, or soccer.  Ride a bike.  Play tennis or racquetball.  Climb stairs.  Even some household chores can be aerobic--just do them at a faster pace. Vacuuming, raking or mowing the lawn, sweeping the garage, and washing and waxing the car all can help get your heart rate up.  Strengthen your muscles during the week. You don't have to lift heavy weights or grow big, bulky muscles to get stronger. Doing a few simple activities that make your muscles work against, or \"resist,\" something can help you get stronger.  For example, you can:  Do push-ups or sit-ups, which use your own body weight as resistance.  Lift weights or dumbbells or use stretch bands at home or in a gym or community center.  Stretch your muscles often. Stretching will help you as you become more active. It can help you stay flexible, loosen tight muscles, and avoid injury. It can also " "help improve your balance and posture and can be a great way to relax.  Be sure to stretch the muscles you'll be using when you work out. It's best to warm your muscles slightly before you stretch them. Walk or do some other light aerobic activity for a few minutes, and then start stretching.  When you stretch your muscles:  Do it slowly. Stretching is not about going fast or making sudden movements.  Don't push or bounce during a stretch.  Hold each stretch for at least 15 to 30 seconds, if you can. You should feel a stretch in the muscle, but not pain.  Breathe out as you do the stretch. Then breathe in as you hold the stretch. Don't hold your breath.  If you're worried about how more activity might affect your health, have a checkup before you start. Follow any special advice your doctor gives you for getting a smart start.  Where can you learn more?  Go to https://www.[employer].arGEN-X/patiented  Enter W332 in the search box to learn more about \"Learning About Being Physically Active.\"  Current as of: October 10, 2022               Content Version: 13.7    4238-7292 [employer].   Care instructions adapted under license by your healthcare professional. If you have questions about a medical condition or this instruction, always ask your healthcare professional. [employer] disclaims any warranty or liability for your use of this information.      Learning About Dietary Guidelines  What are the Dietary Guidelines for Americans?     Dietary Guidelines for Americans provide tips for eating well and staying healthy. This helps reduce the risk for long-term (chronic) diseases.  These guidelines recommend that you:  Eat and drink the right amount for you. The U.[place]. government's food guide is called MyPlate. It can help you make your own well-balanced eating plan.  Try to balance your eating with your activity. This helps you stay at a healthy weight.  Drink alcohol in moderation, if at all.  Limit " "foods high in salt, saturated fat, trans fat, and added sugar.  These guidelines are from the U.S. Department of Agriculture and the U.S. Department of Health and Human Services. They are updated every 5 years.  What is MyPlate?  MyPlate is the U.S. government's food guide. It can help you make your own well-balanced eating plan. A balanced eating plan means that you eat enough, but not too much, and that your food gives you the nutrients you need to stay healthy.  MyPlate focuses on eating plenty of whole grains, fruits, and vegetables, and on limiting fat and sugar. It is available online at www.ChooseMyPlate.gov.  How can you get started?  If you're trying to eat healthier, you can slowly change your eating habits over time. You don't have to make big changes all at once. Start by adding one or two healthy foods to your meals each day.  Grains  Choose whole-grain breads and cereals and whole-wheat pasta and whole-grain crackers.  Vegetables  Eat a variety of vegetables every day. They have lots of nutrients and are part of a heart-healthy diet.  Fruits  Eat a variety of fruits every day. Fruits contain lots of nutrients. Choose fresh fruit instead of fruit juice.  Protein foods  Choose fish and lean poultry more often. Eat red meat and fried meats less often. Dried beans, tofu, and nuts are also good sources of protein.  Dairy  Choose low-fat or fat-free products from this food group. If you have problems digesting milk, try eating cheese or yogurt instead.  Fats and oils  Limit fats and oils if you're trying to cut calories. Choose healthy fats when you cook. These include canola oil and olive oil.  Where can you learn more?  Go to https://www.healthDaojia.net/patiented  Enter D676 in the search box to learn more about \"Learning About Dietary Guidelines.\"  Current as of: March 1, 2023               Content Version: 13.7    6402-3895 [employer].   Care instructions adapted under license by your " healthcare professional. If you have questions about a medical condition or this instruction, always ask your healthcare professional. Healthwise, [facility] disclaims any warranty or liability for your use of this information.      Learning About Depression Screening  What is depression screening?  Depression screening is a way to see if you have depression symptoms. It may be done by a doctor or counselor. It's often part of a routine checkup. That's because your mental health is just as important as your physical health.  Depression is a mental health condition that affects how you feel, think, and act. You may:  Have less energy.  Lose interest in your daily activities.  Feel sad and grouchy for a long time.  Depression is very common. It affects people of all ages.  Many things can lead to depression. Some people become depressed after they have a stroke or find out they have a major illness like cancer or heart disease. The death of a loved one or a breakup may lead to depression. It can run in families. Most experts believe that a combination of inherited genes and stressful life events can cause it.  What happens during screening?  You may be asked to fill out a form about your depression symptoms. You and the doctor will discuss your answers. The doctor may ask you more questions to learn more about how you think, act, and feel.  What happens after screening?  If you have symptoms of depression, your doctor will talk to you about your options.  Doctors usually treat depression with medicines or counseling. Often, combining the two works best. Many people don't get help because they think that they'll get over the depression on their own. But people with depression may not get better unless they get treatment.  The cause of depression is not well understood. There may be many factors involved. But if you have depression, it's not your fault.  A serious symptom of depression is thinking about death or  "suicide. If you or someone you care about talks about this or about feeling hopeless, get help right away.  It's important to know that depression can be treated. Medicine, counseling, and self-care may help.  Where can you learn more?  Go to https://www.Furious.net/patiented  Enter T185 in the search box to learn more about \"Learning About Depression Screening.\"  Current as of: October 20, 2022               Content Version: 13.7    8501-3747 [email or web address].   Care instructions adapted under license by your healthcare professional. If you have questions about a medical condition or this instruction, always ask your healthcare professional. [email or web address] disclaims any warranty or liability for your use of this information.         "

## 2023-09-11 NOTE — LETTER
My Depression Action Plan  Name: Cesia Cardona   Date of Birth 1945  Date: 9/11/2023    My doctor: Tong Cyone   My clinic: [address]  [facility] [address]  [phone]            GREEN    ZONE   Good Control    What it looks like:   Things are going generally well. You have normal ups and downs. You may even feel depressed from time to time, but bad moods usually last less than a day.   What you need to do:  Continue to care for yourself (see self care plan)  Check your depression survival kit and update it as needed  Follow your physician s recommendations including any medication.  Do not stop taking medication unless you consult with your physician first.             YELLOW         ZONE Getting Worse    What it looks like:   Depression is starting to interfere with your life.   It may be hard to get out of bed; you may be starting to isolate yourself from others.  Symptoms of depression are starting to last most all day and this has happened for several days.   You may have suicidal thoughts but they are not constant.   What you need to do:     Call your care team. Your response to treatment will improve if you keep your care team informed of your progress. Yellow periods are signs an adjustment may need to be made.     Continue your self-care.  Just get dressed and ready for the day.  Don't give yourself time to talk yourself out of it.    Talk to someone in your support network.    Open up your Depression Self-Care Plan/Wellness Kit.             RED    ZONE Medical Alert - Get Help    What it looks like:   Depression is seriously interfering with your life.   You may experience these or other symptoms: You can t get out of bed most days, can t work or engage in other necessary activities, you have trouble taking care of basic hygiene, or basic responsibilities, thoughts of suicide or death that will not go away, self-injurious  behavior.     What you need to do:  Call your care team and request a same-day appointment. If they are not available (weekends or after hours) call your local crisis line, emergency room or 911.          Depression Self-Care Plan / Wellness Kit    Many people find that medication and therapy are helpful treatments for managing depression. In addition, making small changes to your everyday life can help to boost your mood and improve your wellbeing. Below are some tips for you to consider. Be sure to talk with your medical provider and/or behavioral health consultant if your symptoms are worsening or not improving.     Sleep   Sleep hygiene  means all of the habits that support good, restful sleep. It includes maintaining a consistent bedtime and wake time, using your bedroom only for sleeping or sex, and keeping the bedroom dark and free of distractions like a computer, smartphone, or television.     Develop a Healthy Routine  Maintain good hygiene. Get out of bed in the morning, make your bed, brush your teeth, take a shower, and get dressed. Don t spend too much time viewing media that makes you feel stressed. Find time to relax each day.    Exercise  Get some form of exercise every day. This will help reduce pain and release endorphins, the  feel good  chemicals in your brain. It can be as simple as just going for a walk or doing some gardening, anything that will get you moving.      Diet  Strive to eat healthy foods, including fruits and vegetables. Drink plenty of water. Avoid excessive sugar, caffeine, alcohol, and other mood-altering substances.     Stay Connected with Others  Stay in touch with friends and family members.    Manage Your Mood  Try deep breathing, massage therapy, biofeedback, or meditation. Take part in fun activities when you can. Try to find something to smile about each day.     Psychotherapy  Be open to working with a therapist if your provider recommends it.     Medication  Be sure to  take your medication as prescribed. Most anti-depressants need to be taken every day. It usually takes several weeks for medications to work. Not all medicines work for all people. It is important to follow-up with your provider to make sure you have a treatment plan that is working for you. Do not stop your medication abruptly without first discussing it with your provider.    Crisis Resources   These hotlines are for both adults and children. They and are open 24 hours a day, 7 days a week unless noted otherwise.    National Suicide Prevention Lifeline   988 or 7-486-674-FLLY (3733)    Crisis Text Line    www.crisistextline.org  Text HOME to 694724 from anywhere in the [place], anytime, about any type of crisis. A live, trained crisis counselor will receive the text and respond quickly.    [facility] Lifeline for LGBTQ Youth  A national crisis intervention and suicide lifeline for LGBTQ youth under 25. Provides a safe place to talk without judgement. Call 1-484.623.7908; text START to 288431 or visit www.thetrevorproject.org to talk to a trained counselor.    For Critical access hospital crisis numbers, visit the [facility] website at:  https://[place].gov/dhs/people-we-serve/adults/health-care/mental-health/resources/crisis-contacts.jsp

## 2023-09-11 NOTE — PROGRESS NOTES
"SUBJECTIVE:   Cesia is a 77 year old who presents for Preventive Visit.      9/11/2023     1:19 PM   Additional Questions   Roomed by Anisa   Accompanied by Self         9/11/2023     1:19 PM   Patient Reported Additional Medications   Patient reports taking the following new medications none       Are you in the first 12 months of your Medicare coverage?  No    Healthy Habits:     In general, how would you rate your overall health?  Good    Frequency of exercise:  1 day/week    Duration of exercise:  Less than 15 minutes    Do you usually eat at least 4 servings of fruit and vegetables a day, include whole grains    & fiber and avoid regularly eating high fat or \"junk\" foods?  No    Taking medications regularly:  Yes    Medication side effects:  None    Ability to successfully perform activities of daily living:  No assistance needed    Home Safety:  No safety concerns identified    Hearing Impairment:  No hearing concerns    In the past 6 months, have you been bothered by leaking of urine?  No    In general, how would you rate your overall mental or emotional health?  Good    Additional concerns today:  No        Have you ever done Advance Care Planning? (For example, a Health Directive, POLST, or a discussion with a medical provider or your loved ones about your wishes): No, advance care planning information given to patient to review.  Advanced care planning was discussed at today's visit.    No hearing concerns   Fall risk  Fallen 2 or more times in the past year?: No  Any fall with injury in the past year?: No    Cognitive Screening   1) Repeat 3 items (Leader, Season, Table)    2) Clock draw: NORMAL  3) 3 item recall: Recalls 3 objects  Results: 3 items recalled: COGNITIVE IMPAIRMENT LESS LIKELY    Mini-CogTM Copyright KAMRAN Ortega. Licensed by the author for use in [facility]; reprinted with permission (esther@.[email or web address]). All rights reserved.      Do you have sleep apnea, excessive snoring or daytime " drowsiness? : no    Reviewed and updated as needed this visit by clinical staff   Tobacco  Allergies  Meds              Reviewed and updated as needed this visit by Provider                 Social History     Tobacco Use    Smoking status: Never     Passive exposure: Never    Smokeless tobacco: Never   Substance Use Topics    Alcohol use: Not Currently     Comment: socially- hardly              9/11/2023     1:19 PM   Alcohol Use   Prescreen: >3 drinks/day or >7 drinks/week? No     Do you have a current opioid prescription? No  Do you use any other controlled substances or medications that are not prescribed by a provider? None              Current providers sharing in care for this patient include:   Patient Care Team:  Nery Mosley MD as PCP - General (Family Practice)  Nery Mosley MD as Assigned PCP    The following health maintenance items are reviewed in Epic and correct as of today:  Health Maintenance   Topic Date Due    DEPRESSION ACTION PLAN  Never done    ZOSTER IMMUNIZATION (1 of 2) Never done    COVID-19 Vaccine (4 - Moderna series) 01/17/2022    MAMMO SCREENING  04/28/2022    COLORECTAL CANCER SCREENING  08/02/2022    DEXA  04/28/2023    MEDICARE ANNUAL WELLNESS VISIT  07/27/2023    ANNUAL REVIEW OF HM ORDERS  07/27/2023    INFLUENZA VACCINE (1) 09/01/2023    PHQ-9  03/11/2024    FALL RISK ASSESSMENT  09/11/2024    LIPID  05/05/2026    ADVANCE CARE PLANNING  07/27/2027    DTAP/TDAP/TD IMMUNIZATION (3 - Td or Tdap) 10/11/2029    HEPATITIS C SCREENING  Completed    Pneumococcal Vaccine: 65+ Years  Completed    IPV IMMUNIZATION  Aged Out    HPV IMMUNIZATION  Aged Out    MENINGITIS IMMUNIZATION  Aged Out     BP Readings from Last 3 Encounters:   09/11/23 110/68   11/08/22 138/68   07/27/22 110/70    Wt Readings from Last 3 Encounters:   09/11/23 99.8 kg (220 lb)   11/08/22 96.2 kg (212 lb)   07/27/22 96.4 kg (212 lb 8 oz)                  Patient Active Problem List   Diagnosis    Depressive  disorder, not elsewhere classified    Age-related osteoporosis without current pathological fracture    BMI 33.0-33.9,adult    Vitamin D deficiency     Past Surgical History:   Procedure Laterality Date    HC REMOVAL OF TONSILS,<13 Y/O      ZZC LIGATE FALLOPIAN TUBE         Social History     Tobacco Use    Smoking status: Never     Passive exposure: Never    Smokeless tobacco: Never   Substance Use Topics    Alcohol use: Not Currently     Comment: socially- hardly      Family History   Problem Relation Age of Onset    Breast Cancer Mother     Diabetes Mother     Heart Disease Father     Hypertension Father     Hypertension Sister     C.A.D. Brother     Hypertension Brother     Lipids Brother     Musculoskeletal Disorder Brother         chronic back problems    Respiratory Brother         asthma    Psychotic Disorder Brother         depression    Gastrointestinal Disease Brother         inflammatory bowel and diverticulitis         Current Outpatient Medications   Medication Sig Dispense Refill    alendronate (FOSAMAX) 70 MG tablet TAKE 1 TABLET BY MOUTH ONE TIME PER WEEK 12 tablet 3    biotin 1000 MCG TABS tablet Take 1,000 mcg by mouth daily      busPIRone (BUSPAR) 5 MG tablet Take 1 tablet (5 mg) by mouth 2 times daily 60 tablet 1    citalopram (CELEXA) 40 MG tablet Take 1 tablet (40 mg) by mouth daily 90 tablet 1    clobetasol (TEMOVATE) 0.05 % external solution APPLY TO SCALP 3-4 TIMES PER WEEK AT NIGHT      fluocinonide (LIDEX) 0.05 % external ointment       ketoconazole (NIZORAL) 2 % external cream       metroNIDAZOLE (METROGEL) 0.75 % external gel       Multiple Vitamins-Minerals (MULTIVITAMIN ADULT EXTRA C PO)       Multiple Vitamins-Minerals (PRESERVISION/LUTEIN PO)       NEW MED Focus Factor      VITAMIN D PO       loratadine 10 MG capsule Take 10 mg by mouth daily (Patient not taking: Reported on 9/11/2023)       Allergies   Allergen Reactions    Lidocaine Itching     Mammogram Screening:   - We will  "repeat this year and she would like to be done after this.       Pertinent mammograms are reviewed under the imaging tab.    Review of Systems   Constitutional:  Negative for chills and fever.   HENT:  Negative for congestion, ear pain, hearing loss and sore throat.    Eyes:  Negative for pain and visual disturbance.   Respiratory:  Positive for cough and shortness of breath.    Cardiovascular:  Negative for chest pain, palpitations and peripheral edema.   Gastrointestinal:  Negative for abdominal pain, constipation, diarrhea, heartburn, hematochezia and nausea.   Breasts:  Negative for tenderness, breast mass and discharge.   Genitourinary:  Negative for dysuria, frequency, genital sores, hematuria, pelvic pain, urgency, vaginal bleeding and vaginal discharge.   Musculoskeletal:  Negative for arthralgias, joint swelling and myalgias.   Skin:  Negative for rash.   Neurological:  Negative for dizziness, weakness, headaches and paresthesias.   Psychiatric/Behavioral:  Positive for mood changes. The patient is not nervous/anxious.          OBJECTIVE:   /68   Pulse 60   Temp 97.1  F (36.2  C) (Tympanic)   Resp 15   Ht 1.7 m (5' 6.93\")   Wt 99.8 kg (220 lb)   SpO2 97%   BMI 34.53 kg/m   Estimated body mass index is 34.53 kg/m  as calculated from the following:    Height as of this encounter: 1.7 m (5' 6.93\").    Weight as of this encounter: 99.8 kg (220 lb).  Physical Exam  GENERAL: healthy, alert and no distress  EYES: Eyes grossly normal to inspection, PERRL and conjunctivae and sclerae normal  HENT: ear canals and TM's normal, nose and mouth without ulcers or lesions  NECK: no adenopathy, no asymmetry, masses, or scars and thyroid normal to palpation  RESP: lungs clear to auscultation - no rales, rhonchi or wheezes  CV: regular rate and rhythm, normal S1 S2, no S3 or S4, no murmur, click or rub, no peripheral edema and peripheral pulses strong  ABDOMEN: soft, nontender, no hepatosplenomegaly, no masses " and bowel sounds normal  MS: no gross musculoskeletal defects noted, no edema  SKIN: no suspicious lesions or rashes  NEURO: Normal strength and tone, mentation intact and speech normal  PSYCH: mentation appears normal, affect normal/bright    Diagnostic Test Results:  Labs reviewed in Epic  No results found for this or any previous visit (from the past 24 hour(s)).    ASSESSMENT / PLAN:       ICD-10-CM    1. Encounter for Medicare annual wellness exam  Z00.00       2. Moderate major depression (H)  F32.1 citalopram (CELEXA) 40 MG tablet     Adult Mental Health  Referral      3. Anxiety  F41.9 busPIRone (BUSPAR) 5 MG tablet      4. Age-related osteoporosis without current pathological fracture  M81.0 DEXA HIP/PELVIS/SPINE - Future     alendronate (FOSAMAX) 70 MG tablet     Vitamin D Deficiency     Comprehensive metabolic panel (BMP + Alb, Alk Phos, ALT, AST, Total. Bili, TP)     Vitamin D Deficiency     Comprehensive metabolic panel (BMP + Alb, Alk Phos, ALT, AST, Total. Bili, TP)      5. Vitamin D deficiency  E55.9 Vitamin D Deficiency     Vitamin D Deficiency      6. Elevated glucose  R73.09 Hemoglobin A1c     Hemoglobin A1c      7. Visit for screening mammogram  Z12.31 MA SCREENING DIGITAL BILAT - Future  (s+30)      8. Screen for colon cancer  Z12.11 Fecal colorectal cancer screen (FIT)     Fecal colorectal cancer screen (FIT)      9. Screening for hyperlipidemia  Z13.220 Lipid panel reflex to direct LDL Fasting     Lipid panel reflex to direct LDL Fasting        Depression/Anxiety:  - mental health has been a little worse, taking care of brother now.  Noticing she is more on edge and irritable.   - Max dose of Citalopram.  - Added on Buspirone for now as she notes more anxiety versus depressive symptoms   - We discussed potential side effects. Titrate as tolerating.   - She didn't tolerate Wellbutrin in the past. Would need to consider different SSRI or SNRI.     OP/Vitamin D:  - Will recheck Vitamin D  "and calcium levels  - Due for DEXA.     She has a hx of psoriasis and sees dermatology, can send refill of topical steroid if needed.          COUNSELING:  Reviewed preventive health counseling, as reflected in patient instructions      BMI:   Estimated body mass index is 34.53 kg/m  as calculated from the following:    Height as of this encounter: 1.7 m (5' 6.93\").    Weight as of this encounter: 99.8 kg (220 lb).   Weight management plan: Discussed healthy diet and exercise guidelines      She reports that she has never smoked. She has never been exposed to tobacco smoke. She has never used smokeless tobacco.      Appropriate preventive services were discussed with this patient, including applicable screening as appropriate for cardiovascular disease, diabetes, osteopenia/osteoporosis, and glaucoma.  As appropriate for age/gender, discussed screening for colorectal cancer, prostate cancer, breast cancer, and cervical cancer. Checklist reviewing preventive services available has been given to the patient.    Reviewed patients plan of care and provided an AVS. The Intermediate Care Plan ( asthma action plan, low back pain action plan, and migraine action plan) for Cesia meets the Care Plan requirement. This Care Plan has been established and reviewed with the Patient.          Tong Coyne PA-C  [facility]    Identified Health Risks:  I have reviewed Opioid Use Disorder and Substance Use Disorder risk factors and made any needed referrals. Answers submitted by the patient for this visit:  Patient Health Questionnaire (Submitted on 9/11/2023)  If you checked off any problems, how difficult have these problems made it for you to do your work, take care of things at home, or get along with other people?: Somewhat difficult  PHQ9 TOTAL SCORE: 7    "

## 2023-09-11 NOTE — PROGRESS NOTES
She is at risk for lack of exercise and has been provided with information to increase physical activity for the benefit of her well-being.  The patient was counseled and encouraged to consider modifying their diet and eating habits. She was provided with information on recommended healthy diet options.  The patient's PHQ-9 score is consistent with mild depression. She was provided with information regarding depression and was advised to schedule a follow up appointment in 26 weeks to further address this issue.

## 2023-09-12 LAB — DEPRECATED CALCIDIOL+CALCIFEROL SERPL-MC: 64 UG/L (ref 20–75)

## 2023-09-20 PROCEDURE — 82274 ASSAY TEST FOR BLOOD FECAL: CPT | Performed by: PHYSICIAN ASSISTANT

## 2023-09-28 LAB — HEMOCCULT STL QL IA: NEGATIVE

## 2023-10-03 ENCOUNTER — ANCILLARY PROCEDURE (OUTPATIENT)
Dept: BONE DENSITY | Facility: CLINIC | Age: 78
End: 2023-10-03
Attending: PHYSICIAN ASSISTANT
Payer: COMMERCIAL

## 2023-10-03 ENCOUNTER — ANCILLARY PROCEDURE (OUTPATIENT)
Dept: MAMMOGRAPHY | Facility: CLINIC | Age: 78
End: 2023-10-03
Attending: PHYSICIAN ASSISTANT
Payer: COMMERCIAL

## 2023-10-03 DIAGNOSIS — M81.0 AGE-RELATED OSTEOPOROSIS WITHOUT CURRENT PATHOLOGICAL FRACTURE: ICD-10-CM

## 2023-10-03 DIAGNOSIS — Z12.31 VISIT FOR SCREENING MAMMOGRAM: ICD-10-CM

## 2023-10-03 PROCEDURE — 77063 BREAST TOMOSYNTHESIS BI: CPT | Mod: TC | Performed by: RADIOLOGY

## 2023-10-03 PROCEDURE — 77067 SCR MAMMO BI INCL CAD: CPT | Mod: TC | Performed by: RADIOLOGY

## 2023-10-03 PROCEDURE — 77085 DXA BONE DENSITY AXL VRT FX: CPT | Performed by: INTERNAL MEDICINE

## 2023-10-05 DIAGNOSIS — F41.9 ANXIETY: ICD-10-CM

## 2023-10-05 RX ORDER — BUSPIRONE HYDROCHLORIDE 5 MG/1
5 TABLET ORAL 2 TIMES DAILY
Qty: 180 TABLET | Refills: 1 | Status: SHIPPED | OUTPATIENT
Start: 2023-10-05 | End: 2024-05-13

## 2023-10-06 ENCOUNTER — TELEPHONE (OUTPATIENT)
Dept: FAMILY MEDICINE | Facility: OTHER | Age: 78
End: 2023-10-06
Payer: COMMERCIAL

## 2023-10-06 DIAGNOSIS — M81.0 AGE-RELATED OSTEOPOROSIS WITHOUT CURRENT PATHOLOGICAL FRACTURE: Primary | ICD-10-CM

## 2023-10-06 NOTE — TELEPHONE ENCOUNTER
----- Message from Tong Coyne PA-C sent at 10/6/2023  8:40 AM CDT -----  Please call patient. Her DEXA scan has gone back to showing osteoporosis in both hips.  Is she still taking the Alendronate? If not she should be taking it regularly. If she is then we may want to have her meet with Endocrinology to see if there are other options since she should really have gotten more benefit for longer period of time than she has since starting this medication in [id number].     Tong Coyne PA-C

## 2023-10-06 NOTE — TELEPHONE ENCOUNTER
Pt was given the results of her DEXA scan. Pt states that she has been taking her Alendronate as prescribed along with a vitamin D supplement. Pt is open to seeing Endocrinology for other options.

## 2023-10-09 NOTE — TELEPHONE ENCOUNTER
Patient returned call, informed her Endo referral was placed and they will reach out to schedule. Advised patient to reach out to clinic if she does not hear within the week.

## 2024-03-21 NOTE — RESULT ENCOUNTER NOTE
Cesia, your results were all normal.  Please let me know if you have any questions.  Nery Mosley MD  
none
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
## 2024-04-15 ENCOUNTER — OFFICE VISIT (OUTPATIENT)
Dept: ENDOCRINOLOGY | Facility: CLINIC | Age: 79
End: 2024-04-15
Payer: COMMERCIAL

## 2024-04-15 VITALS
HEIGHT: 67 IN | SYSTOLIC BLOOD PRESSURE: 134 MMHG | RESPIRATION RATE: 16 BRPM | DIASTOLIC BLOOD PRESSURE: 71 MMHG | OXYGEN SATURATION: 95 % | WEIGHT: 210 LBS | BODY MASS INDEX: 32.96 KG/M2 | HEART RATE: 67 BPM

## 2024-04-15 DIAGNOSIS — M81.0 AGE-RELATED OSTEOPOROSIS WITHOUT CURRENT PATHOLOGICAL FRACTURE: ICD-10-CM

## 2024-04-15 PROCEDURE — 99204 OFFICE O/P NEW MOD 45 MIN: CPT | Performed by: INTERNAL MEDICINE

## 2024-04-15 NOTE — NURSING NOTE
"Cesia Cardona's goals for this visit include:   Chief Complaint   Patient presents with    New Patient     osteoporosis       She requests these members of her care team be copied on today's visit information: yes    PCP: Tong Coyne    Referring Provider:  Tong Coyne PA-C  [address] [place] [address]    /71 (BP Location: Left arm, Patient Position: Sitting, Cuff Size: Adult Large)   Pulse 67   Resp 16   Ht 1.689 m (5' 6.5\")   Wt 95.3 kg (210 lb)   SpO2 95%   BMI 33.39 kg/m      Do you need any medication refills at today's visit? None    Deepak Ta, EMT      "
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
## 2024-05-13 DIAGNOSIS — F41.9 ANXIETY: ICD-10-CM

## 2024-05-13 RX ORDER — BUSPIRONE HYDROCHLORIDE 5 MG/1
5 TABLET ORAL 2 TIMES DAILY
Qty: 180 TABLET | Refills: 0 | Status: SHIPPED | OUTPATIENT
Start: 2024-05-13 | End: 2024-09-16

## 2024-06-11 DIAGNOSIS — F32.1 MODERATE MAJOR DEPRESSION (H): ICD-10-CM

## 2024-06-11 RX ORDER — CITALOPRAM HYDROBROMIDE 40 MG/1
40 TABLET ORAL DAILY
Qty: 90 TABLET | Refills: 0 | Status: SHIPPED | OUTPATIENT
Start: 2024-06-11 | End: 2024-09-16

## 2024-06-11 NOTE — TELEPHONE ENCOUNTER
Pt states that she got refills of citalopram in sept but didn't need a fill at that time. The pharmacy states the script has  and now she needed to call and get a new scrip. Sophia Morris MA

## 2024-08-12 ENCOUNTER — PATIENT OUTREACH (OUTPATIENT)
Dept: CARE COORDINATION | Facility: CLINIC | Age: 79
End: 2024-08-12
Payer: COMMERCIAL

## 2024-09-03 ENCOUNTER — PATIENT OUTREACH (OUTPATIENT)
Dept: CARE COORDINATION | Facility: CLINIC | Age: 79
End: 2024-09-03
Payer: COMMERCIAL

## 2024-09-16 DIAGNOSIS — F32.1 MODERATE MAJOR DEPRESSION (H): ICD-10-CM

## 2024-09-16 DIAGNOSIS — F41.9 ANXIETY: ICD-10-CM

## 2024-09-16 RX ORDER — CITALOPRAM HYDROBROMIDE 40 MG/1
40 TABLET ORAL DAILY
Qty: 90 TABLET | Refills: 0 | Status: SHIPPED | OUTPATIENT
Start: 2024-09-16

## 2024-09-16 RX ORDER — BUSPIRONE HYDROCHLORIDE 5 MG/1
5 TABLET ORAL 2 TIMES DAILY
Qty: 180 TABLET | Refills: 0 | Status: SHIPPED | OUTPATIENT
Start: 2024-09-16

## 2024-09-16 NOTE — TELEPHONE ENCOUNTER
Patient needs a refill on her medications as she is out. She was unable to get in until October 15th and is asking if she can get a refill to get her through until she sees you. Thank you!    Lashay Vides,LUCN, RN

## 2024-10-10 ENCOUNTER — ANCILLARY PROCEDURE (OUTPATIENT)
Dept: MAMMOGRAPHY | Facility: OTHER | Age: 79
End: 2024-10-10
Attending: PHYSICIAN ASSISTANT
Payer: COMMERCIAL

## 2024-10-10 DIAGNOSIS — Z12.31 VISIT FOR SCREENING MAMMOGRAM: ICD-10-CM

## 2024-10-10 PROCEDURE — 77063 BREAST TOMOSYNTHESIS BI: CPT | Mod: TC | Performed by: RADIOLOGY

## 2024-10-10 PROCEDURE — 77067 SCR MAMMO BI INCL CAD: CPT | Mod: TC | Performed by: RADIOLOGY

## 2024-10-15 ENCOUNTER — OFFICE VISIT (OUTPATIENT)
Dept: FAMILY MEDICINE | Facility: OTHER | Age: 79
End: 2024-10-15
Payer: COMMERCIAL

## 2024-10-15 VITALS
HEIGHT: 66 IN | DIASTOLIC BLOOD PRESSURE: 77 MMHG | TEMPERATURE: 96.8 F | WEIGHT: 217 LBS | RESPIRATION RATE: 14 BRPM | SYSTOLIC BLOOD PRESSURE: 135 MMHG | BODY MASS INDEX: 34.87 KG/M2 | HEART RATE: 63 BPM | OXYGEN SATURATION: 97 %

## 2024-10-15 DIAGNOSIS — E78.5 HYPERLIPIDEMIA LDL GOAL <100: ICD-10-CM

## 2024-10-15 DIAGNOSIS — R41.3 MEMORY IMPAIRMENT: ICD-10-CM

## 2024-10-15 DIAGNOSIS — R58 ECCHYMOSIS: ICD-10-CM

## 2024-10-15 DIAGNOSIS — R73.09 ELEVATED GLUCOSE: ICD-10-CM

## 2024-10-15 DIAGNOSIS — Z00.00 ENCOUNTER FOR MEDICARE ANNUAL WELLNESS EXAM: Primary | ICD-10-CM

## 2024-10-15 DIAGNOSIS — Z13.220 SCREENING FOR HYPERLIPIDEMIA: ICD-10-CM

## 2024-10-15 DIAGNOSIS — R01.1 HEART MURMUR: ICD-10-CM

## 2024-10-15 DIAGNOSIS — F41.9 ANXIETY: ICD-10-CM

## 2024-10-15 DIAGNOSIS — F32.1 MODERATE MAJOR DEPRESSION (H): ICD-10-CM

## 2024-10-15 PROCEDURE — 90480 ADMN SARSCOV2 VAC 1/ONLY CMP: CPT | Performed by: PHYSICIAN ASSISTANT

## 2024-10-15 PROCEDURE — 90662 IIV NO PRSV INCREASED AG IM: CPT | Performed by: PHYSICIAN ASSISTANT

## 2024-10-15 PROCEDURE — G0439 PPPS, SUBSEQ VISIT: HCPCS | Performed by: PHYSICIAN ASSISTANT

## 2024-10-15 PROCEDURE — G0008 ADMIN INFLUENZA VIRUS VAC: HCPCS | Performed by: PHYSICIAN ASSISTANT

## 2024-10-15 PROCEDURE — 91320 SARSCV2 VAC 30MCG TRS-SUC IM: CPT | Performed by: PHYSICIAN ASSISTANT

## 2024-10-15 PROCEDURE — 99214 OFFICE O/P EST MOD 30 MIN: CPT | Mod: 25 | Performed by: PHYSICIAN ASSISTANT

## 2024-10-15 RX ORDER — CITALOPRAM HYDROBROMIDE 40 MG/1
40 TABLET ORAL DAILY
Qty: 90 TABLET | Refills: 1 | Status: SHIPPED | OUTPATIENT
Start: 2024-10-15

## 2024-10-15 RX ORDER — BUSPIRONE HYDROCHLORIDE 5 MG/1
5 TABLET ORAL 2 TIMES DAILY
Qty: 180 TABLET | Refills: 1 | Status: SHIPPED | OUTPATIENT
Start: 2024-10-15

## 2024-10-15 SDOH — HEALTH STABILITY: PHYSICAL HEALTH: ON AVERAGE, HOW MANY DAYS PER WEEK DO YOU ENGAGE IN MODERATE TO STRENUOUS EXERCISE (LIKE A BRISK WALK)?: 0 DAYS

## 2024-10-15 SDOH — HEALTH STABILITY: PHYSICAL HEALTH: ON AVERAGE, HOW MANY MINUTES DO YOU ENGAGE IN EXERCISE AT THIS LEVEL?: 0 MIN

## 2024-10-15 ASSESSMENT — PATIENT HEALTH QUESTIONNAIRE - PHQ9
SUM OF ALL RESPONSES TO PHQ QUESTIONS 1-9: 4
SUM OF ALL RESPONSES TO PHQ QUESTIONS 1-9: 4
10. IF YOU CHECKED OFF ANY PROBLEMS, HOW DIFFICULT HAVE THESE PROBLEMS MADE IT FOR YOU TO DO YOUR WORK, TAKE CARE OF THINGS AT HOME, OR GET ALONG WITH OTHER PEOPLE: NOT DIFFICULT AT ALL

## 2024-10-15 ASSESSMENT — SOCIAL DETERMINANTS OF HEALTH (SDOH): HOW OFTEN DO YOU GET TOGETHER WITH FRIENDS OR RELATIVES?: ONCE A WEEK

## 2024-10-15 ASSESSMENT — PAIN SCALES - GENERAL: PAINLEVEL: MODERATE PAIN (4)

## 2024-10-15 NOTE — PATIENT INSTRUCTIONS
Patient Education   Preventive Care Advice   This is general advice given by our system to help you stay healthy. However, your care team may have specific advice just for you. Please talk to your care team about your preventive care needs.  Nutrition  Eat 5 or more servings of fruits and vegetables each day.  Try wheat bread, brown rice and whole grain pasta (instead of white bread, rice, and pasta).  Get enough calcium and vitamin D. Check the label on foods and aim for 100% of the RDA (recommended daily allowance).  Lifestyle  Exercise at least 150 minutes each week  (30 minutes a day, 5 days a week).  Do muscle strengthening activities 2 days a week. These help control your weight and prevent disease.  No smoking.  Wear sunscreen to prevent skin cancer.  Have a dental exam and cleaning every 6 months.  Yearly exams  See your health care team every year to talk about:  Any changes in your health.  Any medicines your care team has prescribed.  Preventive care, family planning, and ways to prevent chronic diseases.  Shots (vaccines)   HPV shots (up to age 26), if you've never had them before.  Hepatitis B shots (up to age 59), if you've never had them before.  COVID-19 shot: Get this shot when it's due.  Flu shot: Get a flu shot every year.  Tetanus shot: Get a tetanus shot every 10 years.  Pneumococcal, hepatitis A, and RSV shots: Ask your care team if you need these based on your risk.  Shingles shot (for age 50 and up)  General health tests  Diabetes screening:  Starting at age 35, Get screened for diabetes at least every 3 years.  If you are younger than age 35, ask your care team if you should be screened for diabetes.  Cholesterol test: At age 39, start having a cholesterol test every 5 years, or more often if advised.  Bone density scan (DEXA): At age 50, ask your care team if you should have this scan for osteoporosis (brittle bones).  Hepatitis C: Get tested at least once in your life.  STIs (sexually  transmitted infections)  Before age 24: Ask your care team if you should be screened for STIs.  After age 24: Get screened for STIs if you're at risk. You are at risk for STIs (including HIV) if:  You are sexually active with more than one person.  You don't use condoms every time.  You or a partner was diagnosed with a sexually transmitted infection.  If you are at risk for HIV, ask about PrEP medicine to prevent HIV.  Get tested for HIV at least once in your life, whether you are at risk for HIV or not.  Cancer screening tests  Cervical cancer screening: If you have a cervix, begin getting regular cervical cancer screening tests starting at age 21.  Breast cancer scan (mammogram): If you've ever had breasts, begin having regular mammograms starting at age 40. This is a scan to check for breast cancer.  Colon cancer screening: It is important to start screening for colon cancer at age 45.  Have a colonoscopy test every 10 years (or more often if you're at risk) Or, ask your provider about stool tests like a FIT test every year or Cologuard test every 3 years.  To learn more about your testing options, visit:   .  For help making a decision, visit:   https://bit.ly/lf20959.  Prostate cancer screening test: If you have a prostate, ask your care team if a prostate cancer screening test (PSA) at age 55 is right for you.  Lung cancer screening: If you are a current or former smoker ages 50 to 80, ask your care team if ongoing lung cancer screenings are right for you.  For informational purposes only. Not to replace the advice of your health care provider. Copyright   2023 [facility] [employer]. All rights reserved. Clinically reviewed by the [facility]. [place] [facility] 318741 - REV 01/24.  Hearing Loss: Care Instructions  Overview     Hearing loss is a sudden or slow decrease in how well you hear. It can range from slight to profound. Permanent hearing loss can occur with aging. It also can  happen when you are exposed long-term to loud noise. Examples include listening to loud music, riding motorcycles, or being around other loud machines.  Hearing loss can affect your work and home life. It can make you feel lonely or depressed. You may feel that you have lost your independence. But hearing aids and other devices can help you hear better and feel connected to others.  Follow-up care is a key part of your treatment and safety. Be sure to make and go to all appointments, and call your doctor if you are having problems. It's also a good idea to know your test results and keep a list of the medicines you take.  How can you care for yourself at home?  Avoid loud noises whenever possible. This helps keep your hearing from getting worse.  Always wear hearing protection around loud noises.  Wear a hearing aid as directed.  A professional can help you pick a hearing aid that will work best for you.  You can also get hearing aids over the counter for mild to moderate hearing loss.  Have hearing tests as your doctor suggests. They can show whether your hearing has changed. Your hearing aid may need to be adjusted.  Use other devices as needed. These may include:  Telephone amplifiers and hearing aids that can connect to a television, stereo, radio, or microphone.  Devices that use lights or vibrations. These alert you to the doorbell, a ringing telephone, or a baby monitor.  Television closed-captioning. This shows the words at the bottom of the screen. Most new TVs can do this.  TTY (text telephone). This lets you type messages back and forth on the telephone instead of talking or listening. These devices are also called TDD. When messages are typed on the keyboard, they are sent over the phone line to a receiving TTY. The message is shown on a monitor.  Use text messaging, social media, and email if it is hard for you to communicate by telephone.  Try to learn a listening technique called speechreading. It is  "not lipreading. You pay attention to people's gestures, expressions, posture, and tone of voice. These clues can help you understand what a person is saying. Face the person you are talking to, and have them face you. Make sure the lighting is good. You need to see the other person's face clearly.  Think about counseling if you need help to adjust to your hearing loss.  When should you call for help?  Watch closely for changes in your health, and be sure to contact your doctor if:    You think your hearing is getting worse.     You have new symptoms, such as dizziness or nausea.   Where can you learn more?  Go to [email or web address]  Enter R798 in the search box to learn more about \"Hearing Loss: Care Instructions.\"  Current as of: September 27, 2023  Content Version: 14.2 2024 [email or web address].   Care instructions adapted under license by your healthcare professional. If you have questions about a medical condition or this instruction, always ask your healthcare professional. [employer] disclaims any warranty or liability for your use of this information.    Bladder Training: Care Instructions  Your Care Instructions     Bladder training is used to treat urge incontinence and stress incontinence. Urge incontinence means that the need to urinate comes on so fast that you can't get to a toilet in time. Stress incontinence means that you leak urine because of pressure on your bladder. For example, it may happen when you laugh, cough, or lift something heavy.  Bladder training can increase how long you can wait before you have to urinate. It can also help your bladder hold more urine. And it can give you better control over the urge to urinate.  It is important to remember that bladder training takes a few weeks to a few months to make a difference. You may not see results right away, but don't give up.  Follow-up care is a key part of your treatment and safety. Be sure to make " and go to all appointments, and call your doctor if you are having problems. It's also a good idea to know your test results and keep a list of the medicines you take.  How can you care for yourself at home?  Work with your doctor to come up with a bladder training program that is right for you. You may use one or more of the following methods.  Delayed urination  In the beginning, try to keep from urinating for 5 minutes after you first feel the need to go.  While you wait, take deep, slow breaths to relax. Kegel exercises can also help you delay the need to go to the bathroom.  After some practice, when you can easily wait 5 minutes to urinate, try to wait 10 minutes before you urinate.  Slowly increase the waiting period until you are able to control when you have to urinate.  Scheduled urination  Empty your bladder when you first wake up in the morning.  Schedule times throughout the day when you will urinate.  Start by going to the bathroom every hour, even if you don't need to go.  Slowly increase the time between trips to the bathroom.  When you have found a schedule that works well for you, keep doing it.  If you wake up during the night and have to urinate, do it. Apply your schedule to waking hours only.  Kegel exercises  These tighten and strengthen pelvic muscles, which can help you control the flow of urine. (If doing these exercises causes pain, stop doing them and talk with your doctor.) To do Kegel exercises:  Squeeze your muscles as if you were trying not to pass gas. Or squeeze your muscles as if you were stopping the flow of urine. Your belly, legs, and buttocks shouldn't move.  Hold the squeeze for 3 seconds, then relax for 5 to 10 seconds.  Start with 3 seconds, then add 1 second each week until you are able to squeeze for 10 seconds.  Repeat the exercise 10 times a session. Do 3 to 8 sessions a day.  When should you call for help?  Watch closely for changes in your health, and be sure to  "contact your doctor if:    Your incontinence is getting worse.     You do not get better as expected.   Where can you learn more?  Go to [email or web address]  Enter V684 in the search box to learn more about \"Bladder Training: Care Instructions.\"  Current as of: November 15, 2023  Content Version: 14.2 2024 [facility] [employer]   Care instructions adapted under license by your healthcare professional. If you have questions about a medical condition or this instruction, always ask your healthcare professional. [employer] disclaims any warranty or liability for your use of this information.       "

## 2024-10-15 NOTE — PROGRESS NOTES
Answers submitted by the patient for this visit:  Patient Health Questionnaire (Submitted on 10/15/2024)  If you checked off any problems, how difficult have these problems made it for you to do your work, take care of things at home, or get along with other people?: Not difficult at all  PHQ9 TOTAL SCORE: 4  Preventive Care Visit  [facility]  Tong Coyne PA-C, Family Medicine  Oct 15, 2024      Assessment & Plan     Encounter for Medicare annual wellness exam   Covid and flu vaccines given.     Anxiety  Moderate major depression (H)  Mood is stable, referral given for therapy if needed. Continue on celexa and buspar at current doses.   - citalopram (CELEXA) 40 MG tablet; Take 1 tablet (40 mg) by mouth daily.  - busPIRone (BUSPAR) 5 MG tablet; Take 1 tablet (5 mg) by mouth 2 times daily.    Elevated glucose  Last A1C on 9/11/23 was 5.5. Repeat A1C for diabetes screening.   - Hemoglobin A1c; Future  - Comprehensive metabolic panel (BMP + Alb, Alk Phos, ALT, AST, Total. Bili, TP); Future    Screening for hyperlipidemia  Hyperlipidemia  The 10-year ASCVD risk score (Addy DK, et al., 2019) is: 19.4%    Values used to calculate the score:      Age: 78 years      Sex: Female      Is Non- : No      Diabetic: No      Tobacco smoker: No      Systolic Blood Pressure: 135 mmHg      Is BP treated: No      HDL Cholesterol: 53 mg/dL      Total Cholesterol: 173 mg/dL  Elevated ASCVD score, recommend starting on cholesterol medication to see if this will help lower. Encouraged Mediterranean Diet as well as exerise.   Last LDL was 87, triglycerides were 166. Repeat lipid panel.    - Lipid panel reflex to direct LDL Fasting; Future    Memory Impairment   Discussed with patient that referrals can be given for genetic testing or for Neurology to further evaluate memory impairment, though patient does endorse her memory has always been poor and she does not feel like it is rapidly  "worsening. Patient will consider options and follow up if she would like further evaluation.     Ecchymosis  - She notes this time it is much less painful. She suspects it is the same as last when a bakers cyst had ruptured.  She has no erythema or significant edema or pain so a deep venous thrombosis is less likely.  If persistent pain then recommend ultrasound and consider MRI imaging.  Recommended elevation and heat in the meantime.  Follow-up if not improving or worse.     BMI  Estimated body mass index is 34.83 kg/m  as calculated from the following:    Height as of this encounter: 1.681 m (5' 6.18\").    Weight as of this encounter: 98.4 kg (217 lb).   Weight management plan noted, stable and monitoring    Counseling  Appropriate preventive services were addressed with this patient via screening, questionnaire, or discussion as appropriate for fall prevention, nutrition, physical activity, Tobacco-use cessation, social engagement, weight loss and cognition.  Checklist reviewing preventive services available has been given to the patient.  Reviewed patient's diet, addressing concerns and/or questions.   The patient was instructed to see the dentist every 6 months.   The patient was provided with written information regarding signs of hearing loss.   Information on urinary incontinence and treatment options given to patient.       El Callaway is a 78 year old, presenting for the following:  Wellness Visit        10/15/2024     1:48 PM   Additional Questions   Roomed by eneida   Accompanied by self         Health Care Directive  Patient does not have a Health Care Directive or Living Will: Discussed advance care planning with patient; information given to patient to review.    HPI    Patient presents today for an annual wellness exam. She notes that on 10/10 she felt a aleksandra horse in her right leg and then began to experience bruising and swelling in her foot, shin, and calf. She had a similar episode " of this in 2020 and an US for DVT was negative at that time and it was assumed she likely had a baker's cysts that opened. Patient also mentions how her memory has been poor for her whole life but now she is becoming more concerned for Alzheimer's disease and is wondering if there is testing. She notes arthritis in her right hip, knee, and ankle but is not interested in any physical therapy at this time.   Cesia's mood has been stable after being back on her antidepressants but she would like to have a therapy referral incase she decides to pursue it in the future. Hearing, vision, and sleep are stable. Cesia has struggled with exercising her whole life and especially does not like exercising outside because this triggers her anxiety. She has many resources for at home exercises but just not use them.       Pain History:  When did you first notice your pain? 1 month ago   Have you seen anyone else for your pain? No  How has your pain affected your ability to work? retired  Where in your body do you have pain? Musculoskeletal problem/pain  Onset/Duration: 1 month ago  Description  Location: leg- ankle - right  Joint Swelling: YES  Redness: YES  Pain: YES  Warmth: No  Intensity:  moderate  Progression of Symptoms:  improving  Accompanying signs and symptoms:   Fevers: No  Numbness/tingling/weakness: YES  History  Trauma to the area: YES  Recent illness:  No  Previous similar problem: YES  Previous evaluation:  YES  Precipitating or alleviating factors:  Aggravating factors include: sitting, standing, walking, and climbing stairs  Therapies tried and outcome: rest/inactivity, heat, ice, immobilization, massage, stretching, support wrap, acetaminophen, and Ibuprofen        10/15/2024   General Health   How would you rate your overall physical health? Good   Feel stress (tense, anxious, or unable to sleep) Not at all            10/15/2024   Nutrition   Diet: Regular (no restrictions)            10/15/2024   Exercise    Days per week of moderate/strenous exercise 0 days   Average minutes spent exercising at this level 0 min      (!) EXERCISE CONCERN      10/15/2024   Social Factors   Frequency of gathering with friends or relatives Once a week   Worry food won't last until get money to buy more No   Food not last or not have enough money for food? No   Do you have housing? (Housing is defined as stable permanent housing and does not include staying ouside in a car, in a tent, in an abandoned building, in an overnight shelter, or couch-surfing.) Yes   Are you worried about losing your housing? No   Lack of transportation? No   Unable to get utilities (heat,electricity)? No            10/15/2024   Fall Risk   Fallen 2 or more times in the past year? No    No   Trouble with walking or balance? No    Yes       Multiple values from one day are sorted in reverse-chronological order          10/15/2024   Activities of Daily Living- Home Safety   Needs help with the following daily activites None of the above   Safety concerns in the home None of the above            10/15/2024   Dental   Dentist two times every year? (!) NO            10/15/2024   Hearing Screening   Hearing concerns? (!) I NEED TO ASK PEOPLE TO SPEAK UP OR REPEAT THEMSELVES.            10/15/2024   Driving Risk Screening   Patient/family members have concerns about driving No            10/15/2024   General Alertness/Fatigue Screening   Have you been more tired than usual lately? No            10/15/2024   Urinary Incontinence Screening   Bothered by leaking urine in past 6 months Yes            10/15/2024   TB Screening   Were you born outside of the [place]? No          Today's PHQ-9 Score:       10/15/2024     9:07 AM   PHQ-9 SCORE   PHQ-9 Total Score MyChart 4 (Minimal depression)   PHQ-9 Total Score 4         10/15/2024   Substance Use   Alcohol more than 3/day or more than 7/wk No   Do you have a current opioid prescription? No   How severe/bad is pain from 1 to 10?  1/10   Do you use any other substances recreationally? No        Social History     Tobacco Use    Smoking status: Never     Passive exposure: Never    Smokeless tobacco: Never   Vaping Use    Vaping status: Never Used   Substance Use Topics    Alcohol use: Not Currently     Comment: socially- hardly     Drug use: No           10/10/2024   LAST FHS-7 RESULTS   1st degree relative breast or ovarian cancer Yes   Any relative bilateral breast cancer No   Any male have breast cancer No   Any ONE woman have BOTH breast AND ovarian cancer No   Any woman with breast cancer before 50yrs Yes   2 or more relatives with breast AND/OR ovarian cancer No   2 or more relatives with breast AND/OR bowel cancer No           Mammogram Screening - After age 74- determine frequency with patient based on health status, life expectancy and patient goals    ASCVD Risk   The 10-year ASCVD risk score (Addy BUSCH, et al., 2019) is: 19.4%    Values used to calculate the score:      Age: 78 years      Sex: Female      Is Non- : No      Diabetic: No      Tobacco smoker: No      Systolic Blood Pressure: 135 mmHg      Is BP treated: No      HDL Cholesterol: 53 mg/dL      Total Cholesterol: 173 mg/dL          Reviewed and updated as needed this visit by Provider                    Past Medical History:   Diagnosis Date    Arthritis     me    Depressive disorder     on pills    Depressive disorder, not elsewhere classified      Past Surgical History:   Procedure Laterality Date    BIOPSY  ?    face    COLONOSCOPY      at home test    COSMETIC SURGERY      me/finger    HC REMOVAL OF TONSILS,<11 Y/O      ORTHOPEDIC SURGERY  ?    me    ZZC LIGATE FALLOPIAN TUBE       OB History    Para Term  AB Living   2 2 2 0 0 2   SAB IAB Ectopic Multiple Live Births   0 0 0 0 0      # Outcome Date GA Lbr Neil/2nd Weight Sex Type Anes PTL Lv   2 Term            1 Term              BP Readings from Last 3  Encounters:   10/15/24 135/77   04/15/24 134/71   09/11/23 110/68    Wt Readings from Last 3 Encounters:   10/15/24 98.4 kg (217 lb)   04/15/24 95.3 kg (210 lb)   09/11/23 99.8 kg (220 lb)                  Patient Active Problem List   Diagnosis    Depressive disorder, not elsewhere classified    Age-related osteoporosis without current pathological fracture    BMI 33.0-33.9,adult    Vitamin D deficiency     Past Surgical History:   Procedure Laterality Date    BIOPSY  1993?    face    COLONOSCOPY  2023    at home test    COSMETIC SURGERY  2014    me/finger    HC REMOVAL OF TONSILS,<11 Y/O      ORTHOPEDIC SURGERY  ?    me    ZZC LIGATE FALLOPIAN TUBE         Social History     Tobacco Use    Smoking status: Never     Passive exposure: Never    Smokeless tobacco: Never   Substance Use Topics    Alcohol use: Not Currently     Comment: socially- hardly      Family History   Problem Relation Age of Onset    Breast Cancer Mother     Diabetes Mother     Hypertension Mother     Other Cancer Mother     Obesity Mother     Heart Disease Father     Hypertension Father     Other Cancer Father         skin    Hypertension Sister     C.A.D. Brother     Hypertension Brother     Coronary Artery Disease Brother     Hyperlipidemia Brother     Depression Brother         do to pain    Anxiety Disorder Brother         because of pain    Asthma Brother     Obesity Brother     Lipids Brother     Musculoskeletal Disorder Brother         chronic back problems    Respiratory Brother         asthma    Psychotic Disorder Brother         depression    Gastrointestinal Disease Brother         inflammatory bowel and diverticulitis    Diabetes Maternal Grandfather     Obesity Maternal Grandfather     Other Cancer Maternal Grandmother         multiple myeloma    Obesity Paternal Grandmother     Diabetes Nephew     Hypertension Sister     Hyperlipidemia Sister     Depression Sister         after  passed    Anxiety Disorder Sister          doesnt want to leave house    Osteoporosis Sister     Hypertension Niece     Depression Niece         life events    Hypertension Nephew     Hyperlipidemia Nephew     Hypertension Sister     Depression Sister         life events    Anxiety Disorder Sister         people/events    Asthma Sister     Obesity Sister     Breast Cancer Cousin     Other Cancer Cousin         goiter    Thyroid Disease Cousin     Breast Cancer Cousin     Obesity Cousin     Breast Cancer Cousin     Other Cancer Cousin         goiter, multiple myeloma    Thyroid Disease Cousin     Depression Nephew         life events    Mental Illness Nephew         was a street person    Substance Abuse Nephew         passed    Substance Abuse Niece         no longer using    Substance Abuse Niece         using    Obesity Cousin     Obesity Cousin          Current Outpatient Medications   Medication Sig Dispense Refill    biotin 1000 MCG TABS tablet Take 1,000 mcg by mouth daily      busPIRone (BUSPAR) 5 MG tablet Take 1 tablet (5 mg) by mouth 2 times daily. 180 tablet 1    citalopram (CELEXA) 40 MG tablet Take 1 tablet (40 mg) by mouth daily. 90 tablet 1    fluocinonide (LIDEX) 0.05 % external ointment       ketoconazole (NIZORAL) 2 % external cream       metroNIDAZOLE (METROGEL) 0.75 % external gel       Multiple Vitamins-Minerals (MULTIVITAMIN ADULT EXTRA C PO)       Multiple Vitamins-Minerals (PRESERVISION/LUTEIN PO)       NEW MED Focus Factor      VITAMIN D PO        Allergies   Allergen Reactions    Lidocaine Itching     Current providers sharing in care for this patient include:  Patient Care Team:  Tong Coyne PA-C as PCP - General (Family Medicine)  Tong Coyne PA-C as Assigned PCP  Mai Palacios MD as MD (Endocrinology, Diabetes, and Metabolism)  Mai Palacios MD as Assigned Endocrinology Provider    The following health maintenance items are reviewed in Epic and correct as of today:  Health Maintenance   Topic Date Due     "ZOSTER IMMUNIZATION (1 of 2) Never done    RSV VACCINE (1 - 1-dose 75+ series) Never done    INFLUENZA VACCINE (1) 09/01/2024    COVID-19 Vaccine (4 - 2024-25 season) 09/01/2024    MEDICARE ANNUAL WELLNESS VISIT  09/11/2024    ANNUAL REVIEW OF HM ORDERS  09/11/2024    PHQ-9  04/15/2025    DEXA  10/03/2025    MAMMO SCREENING  10/10/2025    FALL RISK ASSESSMENT  10/15/2025    GLUCOSE  09/11/2026    LIPID  09/11/2028    ADVANCE CARE PLANNING  09/11/2028    DTAP/TDAP/TD IMMUNIZATION (3 - Td or Tdap) 10/11/2029    HEPATITIS C SCREENING  Completed    DEPRESSION ACTION PLAN  Completed    Pneumococcal Vaccine: 65+ Years  Completed    HPV IMMUNIZATION  Aged Out    MENINGITIS IMMUNIZATION  Aged Out    RSV MONOCLONAL ANTIBODY  Aged Out    COLORECTAL CANCER SCREENING  Discontinued         Review of Systems  Constitutional, HEENT, cardiovascular, pulmonary, GI, , musculoskeletal, neuro, skin, endocrine and psych systems are negative, except as otherwise noted.     Objective    Exam  /77   Pulse 63   Temp 96.8  F (36  C) (Temporal)   Resp 14   Ht 1.681 m (5' 6.18\")   Wt 98.4 kg (217 lb)   SpO2 97%   BMI 34.83 kg/m     Estimated body mass index is 34.83 kg/m  as calculated from the following:    Height as of this encounter: 1.681 m (5' 6.18\").    Weight as of this encounter: 98.4 kg (217 lb).    Physical Exam  GENERAL: alert and no distress  EYES: Eyes grossly normal to inspection, PERRL and conjunctivae and sclerae normal  HENT: ear canals and TM's normal, nose and mouth without ulcers or lesions  NECK: no adenopathy, no asymmetry, masses, or scars  RESP: lungs clear to auscultation - no rales, rhonchi or wheezes  CV: regular rate and rhythm, normal S1 S2, no S3 or S4, faint systolic murmur appreciated most in left carotid, click or rub  ABDOMEN: soft, nontender, no hepatosplenomegaly, no masses and bowel sounds normal  MS: no gross musculoskeletal defects noted  SKIN: Ecchymosis present on medial ankle, shin and " calf of right leg. Trace edema in lower right extremity. Negative homans. No erythema of the lower extremities noted, normal post tib pulses 2+ bilateral. Full passive ROM of the right knee and ankle without pain.   NEURO: Normal strength and tone, mentation intact and speech normal  PSYCH: mentation appears normal, affect normal/bright        10/15/2024   Mini Cog   Clock Draw Score 2 Normal   3 Item Recall 2 objects recalled   Mini Cog Total Score 4                 Signed Electronically by: GERALDINE Crawford Emalie A Scott PA-C, was present with the Physician Assistant student who participated in the service and in the documentation of the note.  I have verified the history and personally performed the physical exam and medical decision making.  I agree with the assessment and plan of care as documented in the note.     Signed electronically by ASHLEY Brar

## 2024-10-16 RX ORDER — ATORVASTATIN CALCIUM 40 MG/1
40 TABLET, FILM COATED ORAL DAILY
Qty: 90 TABLET | Refills: 1 | Status: SHIPPED | OUTPATIENT
Start: 2024-10-16

## 2024-10-17 ENCOUNTER — TELEPHONE (OUTPATIENT)
Dept: FAMILY MEDICINE | Facility: OTHER | Age: 79
End: 2024-10-17
Payer: COMMERCIAL

## 2024-10-17 NOTE — TELEPHONE ENCOUNTER
Patient calling in to review AVS and appointment notes from last appointment with provider. Clarified that medications to stop were just ones taken off medication list as she is no longer taking them. Reviewed that provider talked about her ecchymosis and plan. She is following plan.  Patient also was wondering about stating the statin medication. She has not had labs done yet.  Looks like labs are ordered but not completed yet.  Writer helped patient to schedule lab only appointment on Tuesday.     Tong ARNOLD - patient does not want to start Atorvastatin until after labs are back so knows for sure if needed.  She would like recommendation once labs are back. Thank you!

## 2024-10-22 ENCOUNTER — TELEPHONE (OUTPATIENT)
Dept: FAMILY MEDICINE | Facility: OTHER | Age: 79
End: 2024-10-22

## 2024-10-22 NOTE — TELEPHONE ENCOUNTER
She had been taking Fosamax for a year prior, was she having symptoms then too?  Did she end up starting the atorvastatin, is she talking about that?    Tong Coyne PA-C

## 2024-10-22 NOTE — TELEPHONE ENCOUNTER
Medication Question or Refill        What medication are you calling about (include dose and sig)?: Fosomax    Preferred Pharmacy:   [id number] - [place], [place] - [address] [facility] [address] [facility] [address]  [address]   [place] [facility] [address]  Phone: [phone] Fax: [phone]      When did you use the medication last? 2021    Patient offered an appointment? No    Do you have any questions or concerns?  Yes: Patient is asking if this medication could be helpful for her current symptoms she is experiencing with her aleksandra horses and pain in her leg or if she actually needs to see the orthopedic specialist?      Could we send this information to you in [facility] or would you prefer to receive a phone call?:   Patient would prefer a phone call       BAILEE Driscoll, RN

## 2024-10-23 NOTE — TELEPHONE ENCOUNTER
RN did call and relay message from provider. She said she is not currently taking Fosomax. In chart review, patient saw Endocrinology in [place] 4/15/24. She was advised then to stop the Fosomax.

## 2024-10-23 NOTE — TELEPHONE ENCOUNTER
RN did call and speak with patient. She said yes, she was taking Fosomax last time as well.  We did review medication list, was a current medications at that time. No encounter from December 2020 talked about stopping it for any reason. Patient reports she has had same symptom's this time. She is wearing inserts, may not be the right ones but it is helping some. She is looking at going to [facility]. She is taking ibuprofen 1200mg/day. Takes with food. She is NOT taking Atorvastatin yet, is doing lab work next week and then will start it.

## 2024-10-23 NOTE — TELEPHONE ENCOUNTER
If she wants to stop the Fosamax to see if this is the cause of her symptoms she can certainly do that.     Tong Coyne PA-C

## 2024-10-24 NOTE — TELEPHONE ENCOUNTER
Spoke with patient, plans to see ortho today for right foot issues. Nothing further needed from RN or primary care team at this time.     Sandy Escamilla, RN, BSN

## 2024-10-24 NOTE — TELEPHONE ENCOUNTER
Yes reviewed Endo's note she shouldn't be on Fosamax, she is to follow-up with them this coming year.  What is she requesting? I am still confused. Primitivo horses are usually not something that Ortho typically deals with.  If she is having joint pain she can certainly see them.     Tong Coyne PA-C

## 2024-10-25 ENCOUNTER — LAB (OUTPATIENT)
Dept: LAB | Facility: OTHER | Age: 79
End: 2024-10-25
Payer: COMMERCIAL

## 2024-10-25 DIAGNOSIS — R73.09 ELEVATED GLUCOSE: ICD-10-CM

## 2024-10-25 DIAGNOSIS — Z13.220 SCREENING FOR HYPERLIPIDEMIA: ICD-10-CM

## 2024-10-25 LAB
ALBUMIN SERPL BCG-MCNC: 4.5 G/DL (ref 3.5–5.2)
ALP SERPL-CCNC: 72 U/L (ref 40–150)
ALT SERPL W P-5'-P-CCNC: 19 U/L (ref 0–50)
ANION GAP SERPL CALCULATED.3IONS-SCNC: 11 MMOL/L (ref 7–15)
AST SERPL W P-5'-P-CCNC: 29 U/L (ref 0–45)
BILIRUB SERPL-MCNC: 0.5 MG/DL
BUN SERPL-MCNC: 9.8 MG/DL (ref 8–23)
CALCIUM SERPL-MCNC: 9.6 MG/DL (ref 8.8–10.4)
CHLORIDE SERPL-SCNC: 106 MMOL/L (ref 98–107)
CHOLEST SERPL-MCNC: 200 MG/DL
CREAT SERPL-MCNC: 0.81 MG/DL (ref 0.51–0.95)
EGFRCR SERPLBLD CKD-EPI 2021: 74 ML/MIN/1.73M2
EST. AVERAGE GLUCOSE BLD GHB EST-MCNC: 108 MG/DL
FASTING STATUS PATIENT QL REPORTED: YES
FASTING STATUS PATIENT QL REPORTED: YES
GLUCOSE SERPL-MCNC: 104 MG/DL (ref 70–99)
HBA1C MFR BLD: 5.4 % (ref 0–5.6)
HCO3 SERPL-SCNC: 24 MMOL/L (ref 22–29)
HDLC SERPL-MCNC: 48 MG/DL
LDLC SERPL CALC-MCNC: 110 MG/DL
NONHDLC SERPL-MCNC: 152 MG/DL
POTASSIUM SERPL-SCNC: 4.9 MMOL/L (ref 3.4–5.3)
PROT SERPL-MCNC: 7 G/DL (ref 6.4–8.3)
SODIUM SERPL-SCNC: 141 MMOL/L (ref 135–145)
TRIGL SERPL-MCNC: 208 MG/DL

## 2024-10-25 PROCEDURE — 80061 LIPID PANEL: CPT

## 2024-10-25 PROCEDURE — 36415 COLL VENOUS BLD VENIPUNCTURE: CPT

## 2024-10-25 PROCEDURE — 83036 HEMOGLOBIN GLYCOSYLATED A1C: CPT

## 2024-10-25 PROCEDURE — 80053 COMPREHEN METABOLIC PANEL: CPT

## 2025-02-10 DIAGNOSIS — E78.5 HYPERLIPIDEMIA LDL GOAL <100: ICD-10-CM

## 2025-02-10 RX ORDER — ATORVASTATIN CALCIUM 40 MG/1
40 TABLET, FILM COATED ORAL DAILY
Qty: 90 TABLET | Refills: 1 | OUTPATIENT
Start: 2025-02-10

## 2025-04-13 ENCOUNTER — HEALTH MAINTENANCE LETTER (OUTPATIENT)
Age: 80
End: 2025-04-13

## 2025-06-19 DIAGNOSIS — F32.1 MODERATE MAJOR DEPRESSION (H): ICD-10-CM

## 2025-06-19 RX ORDER — CITALOPRAM HYDROBROMIDE 40 MG/1
40 TABLET ORAL DAILY
Qty: 90 TABLET | Refills: 0 | Status: SHIPPED | OUTPATIENT
Start: 2025-06-19

## 2025-06-25 DIAGNOSIS — F32.1 MODERATE MAJOR DEPRESSION (H): ICD-10-CM

## 2025-06-25 RX ORDER — CITALOPRAM HYDROBROMIDE 40 MG/1
40 TABLET ORAL DAILY
Qty: 90 TABLET | Refills: 0 | OUTPATIENT
Start: 2025-06-25

## 2025-07-25 DIAGNOSIS — F41.9 ANXIETY: ICD-10-CM

## 2025-07-28 RX ORDER — BUSPIRONE HYDROCHLORIDE 5 MG/1
5 TABLET ORAL 2 TIMES DAILY
Qty: 180 TABLET | Refills: 0 | Status: SHIPPED | OUTPATIENT
Start: 2025-07-28

## 2025-08-19 DIAGNOSIS — E78.5 HYPERLIPIDEMIA LDL GOAL <100: ICD-10-CM

## 2025-08-19 RX ORDER — ATORVASTATIN CALCIUM 40 MG/1
40 TABLET, FILM COATED ORAL DAILY
Qty: 90 TABLET | Refills: 0 | Status: SHIPPED | OUTPATIENT
Start: 2025-08-19